# Patient Record
Sex: FEMALE | Race: WHITE | NOT HISPANIC OR LATINO | Employment: FULL TIME | RURAL
[De-identification: names, ages, dates, MRNs, and addresses within clinical notes are randomized per-mention and may not be internally consistent; named-entity substitution may affect disease eponyms.]

---

## 2017-02-08 ENCOUNTER — HISTORICAL (OUTPATIENT)
Dept: ADMINISTRATIVE | Facility: HOSPITAL | Age: 32
End: 2017-02-08

## 2017-02-10 LAB
LAB AP CLINICAL INFORMATION: NORMAL
LAB AP DIAGNOSIS - HISTORICAL: NORMAL
LAB AP GROSS PATHOLOGY - HISTORICAL: NORMAL
LAB AP SPECIMEN SUBMITTED - HISTORICAL: NORMAL

## 2020-04-14 ENCOUNTER — HISTORICAL (OUTPATIENT)
Dept: ADMINISTRATIVE | Facility: HOSPITAL | Age: 35
End: 2020-04-14

## 2020-12-16 ENCOUNTER — HISTORICAL (OUTPATIENT)
Dept: ADMINISTRATIVE | Facility: HOSPITAL | Age: 35
End: 2020-12-16

## 2021-06-07 RX ORDER — TELMISARTAN 40 MG/1
40 TABLET ORAL DAILY
Qty: 30 TABLET | Refills: 1 | Status: SHIPPED | OUTPATIENT
Start: 2021-06-07 | End: 2021-09-02 | Stop reason: SDUPTHER

## 2021-06-07 RX ORDER — TELMISARTAN 40 MG/1
40 TABLET ORAL DAILY
COMMUNITY
Start: 2021-03-23 | End: 2021-06-07 | Stop reason: SDUPTHER

## 2021-06-30 RX ORDER — FUROSEMIDE 20 MG/1
20 TABLET ORAL DAILY PRN
Qty: 30 TABLET | Refills: 0 | Status: SHIPPED | OUTPATIENT
Start: 2021-06-30 | End: 2021-08-30

## 2021-06-30 RX ORDER — FUROSEMIDE 20 MG/1
20 TABLET ORAL DAILY PRN
COMMUNITY
Start: 2021-01-14 | End: 2021-06-30 | Stop reason: SDUPTHER

## 2021-08-11 ENCOUNTER — OFFICE VISIT (OUTPATIENT)
Dept: FAMILY MEDICINE | Facility: CLINIC | Age: 36
End: 2021-08-11
Payer: COMMERCIAL

## 2021-08-11 VITALS — HEART RATE: 92 BPM | TEMPERATURE: 98 F | RESPIRATION RATE: 18 BRPM | OXYGEN SATURATION: 99 %

## 2021-08-11 DIAGNOSIS — J32.0 MAXILLARY SINUSITIS, UNSPECIFIED CHRONICITY: Primary | ICD-10-CM

## 2021-08-11 PROCEDURE — 96372 THER/PROPH/DIAG INJ SC/IM: CPT | Mod: ,,, | Performed by: NURSE PRACTITIONER

## 2021-08-11 PROCEDURE — 96372 PR INJECTION,THERAP/PROPH/DIAG2ST, IM OR SUBCUT: ICD-10-PCS | Mod: ,,, | Performed by: NURSE PRACTITIONER

## 2021-08-11 PROCEDURE — 99214 OFFICE O/P EST MOD 30 MIN: CPT | Mod: 25,,, | Performed by: NURSE PRACTITIONER

## 2021-08-11 PROCEDURE — 99214 PR OFFICE/OUTPT VISIT, EST, LEVL IV, 30-39 MIN: ICD-10-PCS | Mod: 25,,, | Performed by: NURSE PRACTITIONER

## 2021-08-11 RX ORDER — CEFTRIAXONE 1 G/1
1 INJECTION, POWDER, FOR SOLUTION INTRAMUSCULAR; INTRAVENOUS
Status: COMPLETED | OUTPATIENT
Start: 2021-08-11 | End: 2021-08-11

## 2021-08-11 RX ORDER — BUPROPION HYDROCHLORIDE 150 MG/1
1 TABLET ORAL DAILY
COMMUNITY
Start: 2021-08-04 | End: 2021-09-02 | Stop reason: SDUPTHER

## 2021-08-11 RX ORDER — CLONAZEPAM 0.5 MG/1
1 TABLET ORAL 2 TIMES DAILY
COMMUNITY
Start: 2021-08-04 | End: 2021-09-02 | Stop reason: SDUPTHER

## 2021-08-11 RX ORDER — ZOLPIDEM TARTRATE 10 MG/1
1 TABLET ORAL NIGHTLY
COMMUNITY
Start: 2021-08-04 | End: 2021-09-02

## 2021-08-11 RX ORDER — AZITHROMYCIN 250 MG/1
TABLET, FILM COATED ORAL
Qty: 6 TABLET | Refills: 0 | Status: SHIPPED | OUTPATIENT
Start: 2021-08-11 | End: 2021-08-16

## 2021-08-11 RX ORDER — FLUTICASONE PROPIONATE 50 MCG
1 SPRAY, SUSPENSION (ML) NASAL DAILY
Qty: 18 G | Refills: 0 | Status: SHIPPED | OUTPATIENT
Start: 2021-08-11 | End: 2021-08-30

## 2021-08-11 RX ORDER — DEXAMETHASONE SODIUM PHOSPHATE 4 MG/ML
4 INJECTION, SOLUTION INTRA-ARTICULAR; INTRALESIONAL; INTRAMUSCULAR; INTRAVENOUS; SOFT TISSUE
Status: COMPLETED | OUTPATIENT
Start: 2021-08-11 | End: 2021-08-11

## 2021-08-11 RX ORDER — DEXBROMPHENIRAMINE MALEATE 2 MG/1
1 TABLET ORAL EVERY 8 HOURS PRN
Qty: 32 TABLET | Refills: 0 | Status: SHIPPED | OUTPATIENT
Start: 2021-08-11 | End: 2021-08-30

## 2021-08-11 RX ORDER — CITALOPRAM 40 MG/1
1 TABLET, FILM COATED ORAL DAILY
COMMUNITY
Start: 2021-06-30 | End: 2021-09-02 | Stop reason: SDUPTHER

## 2021-08-11 RX ADMIN — CEFTRIAXONE 1 G: 1 INJECTION, POWDER, FOR SOLUTION INTRAMUSCULAR; INTRAVENOUS at 05:08

## 2021-08-11 RX ADMIN — DEXAMETHASONE SODIUM PHOSPHATE 4 MG: 4 INJECTION, SOLUTION INTRA-ARTICULAR; INTRALESIONAL; INTRAMUSCULAR; INTRAVENOUS; SOFT TISSUE at 05:08

## 2021-08-30 ENCOUNTER — OFFICE VISIT (OUTPATIENT)
Dept: FAMILY MEDICINE | Facility: CLINIC | Age: 36
End: 2021-08-30
Payer: COMMERCIAL

## 2021-08-30 VITALS
RESPIRATION RATE: 18 BRPM | HEART RATE: 74 BPM | SYSTOLIC BLOOD PRESSURE: 120 MMHG | TEMPERATURE: 98 F | WEIGHT: 200 LBS | BODY MASS INDEX: 33.32 KG/M2 | HEIGHT: 65 IN | OXYGEN SATURATION: 99 % | DIASTOLIC BLOOD PRESSURE: 80 MMHG

## 2021-08-30 DIAGNOSIS — R23.2 RED FACE: Primary | ICD-10-CM

## 2021-08-30 DIAGNOSIS — L65.9 HAIR LOSS: ICD-10-CM

## 2021-08-30 DIAGNOSIS — E55.9 VITAMIN D DEFICIENCY: ICD-10-CM

## 2021-08-30 DIAGNOSIS — F41.9 ANXIETY: ICD-10-CM

## 2021-08-30 DIAGNOSIS — Z86.59 HX OF ATTENTION DEFICIT DISORDER: ICD-10-CM

## 2021-08-30 DIAGNOSIS — G47.9 SLEEP DISORDER: ICD-10-CM

## 2021-08-30 LAB
25(OH)D3 SERPL-MCNC: 37 NG/ML
ANISOCYTOSIS BLD QL SMEAR: ABNORMAL
BASOPHILS # BLD AUTO: 0.06 K/UL (ref 0–0.2)
BASOPHILS NFR BLD AUTO: 0.6 % (ref 0–1)
DIFFERENTIAL METHOD BLD: ABNORMAL
EOSINOPHIL # BLD AUTO: 0.19 K/UL (ref 0–0.5)
EOSINOPHIL NFR BLD AUTO: 1.9 % (ref 1–4)
ERYTHROCYTE [DISTWIDTH] IN BLOOD BY AUTOMATED COUNT: 25.1 % (ref 11.5–14.5)
HCT VFR BLD AUTO: 39.9 % (ref 38–47)
HGB BLD-MCNC: 12.5 G/DL (ref 12–16)
IMM GRANULOCYTES # BLD AUTO: 0.03 K/UL (ref 0–0.04)
IMM GRANULOCYTES NFR BLD: 0.3 % (ref 0–0.4)
LYMPHOCYTES # BLD AUTO: 3.23 K/UL (ref 1–4.8)
LYMPHOCYTES NFR BLD AUTO: 33 % (ref 27–41)
MCH RBC QN AUTO: 24.4 PG (ref 27–31)
MCHC RBC AUTO-ENTMCNC: 31.3 G/DL (ref 32–36)
MCV RBC AUTO: 77.9 FL (ref 80–96)
MONOCYTES # BLD AUTO: 0.72 K/UL (ref 0–0.8)
MONOCYTES NFR BLD AUTO: 7.4 % (ref 2–6)
MPC BLD CALC-MCNC: 9.4 FL (ref 9.4–12.4)
NEUTROPHILS # BLD AUTO: 5.56 K/UL (ref 1.8–7.7)
NEUTROPHILS NFR BLD AUTO: 56.8 % (ref 53–65)
NRBC # BLD AUTO: 0 X10E3/UL
NRBC, AUTO (.00): 0 %
PLATELET # BLD AUTO: 393 K/UL (ref 150–400)
PLATELET MORPHOLOGY: ABNORMAL
RBC # BLD AUTO: 5.12 M/UL (ref 4.2–5.4)
T3 SERPL-MCNC: 99 NG/DL (ref 60–181)
TSH SERPL DL<=0.005 MIU/L-ACNC: 1.48 UIU/ML (ref 0.36–3.74)
WBC # BLD AUTO: 9.79 K/UL (ref 4.5–11)

## 2021-08-30 PROCEDURE — 84480 T3: ICD-10-PCS | Mod: ,,, | Performed by: CLINICAL MEDICAL LABORATORY

## 2021-08-30 PROCEDURE — 99214 PR OFFICE/OUTPT VISIT, EST, LEVL IV, 30-39 MIN: ICD-10-PCS | Mod: ,,, | Performed by: NURSE PRACTITIONER

## 2021-08-30 PROCEDURE — 85025 CBC WITH DIFFERENTIAL: ICD-10-PCS | Mod: ,,, | Performed by: CLINICAL MEDICAL LABORATORY

## 2021-08-30 PROCEDURE — 86038 ANA EIA W/REFLEX DSDNA/ENA: ICD-10-PCS | Mod: ,,, | Performed by: CLINICAL MEDICAL LABORATORY

## 2021-08-30 PROCEDURE — 84436 T4: ICD-10-PCS | Mod: ,,, | Performed by: CLINICAL MEDICAL LABORATORY

## 2021-08-30 PROCEDURE — 86038 ANTINUCLEAR ANTIBODIES: CPT | Mod: ,,, | Performed by: CLINICAL MEDICAL LABORATORY

## 2021-08-30 PROCEDURE — 99214 OFFICE O/P EST MOD 30 MIN: CPT | Mod: ,,, | Performed by: NURSE PRACTITIONER

## 2021-08-30 PROCEDURE — 84436 ASSAY OF TOTAL THYROXINE: CPT | Mod: ,,, | Performed by: CLINICAL MEDICAL LABORATORY

## 2021-08-30 PROCEDURE — 84480 ASSAY TRIIODOTHYRONINE (T3): CPT | Mod: ,,, | Performed by: CLINICAL MEDICAL LABORATORY

## 2021-08-30 PROCEDURE — 84443 ASSAY THYROID STIM HORMONE: CPT | Mod: ,,, | Performed by: CLINICAL MEDICAL LABORATORY

## 2021-08-30 PROCEDURE — 85025 COMPLETE CBC W/AUTO DIFF WBC: CPT | Mod: ,,, | Performed by: CLINICAL MEDICAL LABORATORY

## 2021-08-30 PROCEDURE — 82306 VITAMIN D: ICD-10-PCS | Mod: ,,, | Performed by: CLINICAL MEDICAL LABORATORY

## 2021-08-30 PROCEDURE — 82306 VITAMIN D 25 HYDROXY: CPT | Mod: ,,, | Performed by: CLINICAL MEDICAL LABORATORY

## 2021-08-30 PROCEDURE — 84443 TSH: ICD-10-PCS | Mod: ,,, | Performed by: CLINICAL MEDICAL LABORATORY

## 2021-08-30 RX ORDER — PROPRANOLOL HYDROCHLORIDE 60 MG/1
1 CAPSULE, EXTENDED RELEASE ORAL DAILY
COMMUNITY
Start: 2021-06-03 | End: 2021-09-02

## 2021-08-30 RX ORDER — DEXTROAMPHETAMINE SULFATE, DEXTROAMPHETAMINE SACCHARATE, AMPHETAMINE ASPARTATE MONOHYDRATE, AND AMPHETAMINE SULFATE 12.5; 12.5; 12.5; 12.5 MG/1; MG/1; MG/1; MG/1
1 CAPSULE, EXTENDED RELEASE ORAL DAILY
COMMUNITY
Start: 2021-08-04 | End: 2021-09-02 | Stop reason: SDUPTHER

## 2021-08-30 RX ORDER — TRIAMCINOLONE ACETONIDE 0.25 MG/G
1 CREAM TOPICAL DAILY PRN
COMMUNITY
Start: 2021-04-16 | End: 2021-09-02

## 2021-08-30 RX ORDER — FLUOXETINE HYDROCHLORIDE 40 MG/1
1 CAPSULE ORAL DAILY
COMMUNITY
Start: 2021-03-10 | End: 2021-08-30

## 2021-08-31 LAB — ANA SER QL: NEGATIVE

## 2021-08-31 RX ORDER — CEPHALEXIN 500 MG/1
500 CAPSULE ORAL EVERY 8 HOURS
Qty: 30 CAPSULE | Refills: 0 | Status: SHIPPED | OUTPATIENT
Start: 2021-08-31 | End: 2021-10-18

## 2021-09-01 LAB — T4 SERPL-MCNC: 6.6 ΜG/DL (ref 4.8–13.9)

## 2021-09-02 DIAGNOSIS — Z79.899 HIGH RISK MEDICATION USE: Primary | ICD-10-CM

## 2021-09-02 RX ORDER — TELMISARTAN 40 MG/1
40 TABLET ORAL DAILY
Qty: 90 TABLET | Refills: 1 | Status: CANCELLED | OUTPATIENT
Start: 2021-09-02

## 2021-09-02 RX ORDER — CITALOPRAM 40 MG/1
40 TABLET, FILM COATED ORAL DAILY
Qty: 90 TABLET | Refills: 0 | Status: SHIPPED | OUTPATIENT
Start: 2021-09-02 | End: 2021-10-18

## 2021-09-02 RX ORDER — CLONAZEPAM 0.5 MG/1
0.5 TABLET ORAL DAILY
Qty: 90 TABLET | Refills: 1 | Status: CANCELLED | OUTPATIENT
Start: 2021-09-02

## 2021-09-02 RX ORDER — CLONAZEPAM 0.5 MG/1
0.5 TABLET ORAL 2 TIMES DAILY
Qty: 60 TABLET | Refills: 0 | Status: SHIPPED | OUTPATIENT
Start: 2021-09-02 | End: 2021-10-18

## 2021-09-02 RX ORDER — TELMISARTAN 40 MG/1
40 TABLET ORAL DAILY
Qty: 90 TABLET | Refills: 0 | Status: SHIPPED | OUTPATIENT
Start: 2021-09-02 | End: 2022-01-27 | Stop reason: SDUPTHER

## 2021-09-02 RX ORDER — PROPRANOLOL HYDROCHLORIDE 80 MG/1
80 CAPSULE, EXTENDED RELEASE ORAL DAILY
Qty: 90 CAPSULE | Refills: 0 | Status: SHIPPED | OUTPATIENT
Start: 2021-09-02 | End: 2021-10-18

## 2021-09-02 RX ORDER — ERGOCALCIFEROL 1.25 MG/1
50000 CAPSULE ORAL
Qty: 12 CAPSULE | Refills: 0 | OUTPATIENT
Start: 2021-09-02

## 2021-09-02 RX ORDER — OMEPRAZOLE 20 MG/1
20 CAPSULE, DELAYED RELEASE ORAL DAILY
Qty: 90 CAPSULE | Refills: 0 | OUTPATIENT
Start: 2021-09-02

## 2021-09-02 RX ORDER — CLONAZEPAM 0.5 MG/1
0.5 TABLET ORAL 2 TIMES DAILY PRN
Qty: 60 TABLET | Refills: 0 | OUTPATIENT
Start: 2021-09-02

## 2021-09-02 RX ORDER — OMEPRAZOLE 20 MG/1
20 CAPSULE, DELAYED RELEASE ORAL DAILY
Qty: 90 CAPSULE | Refills: 0 | Status: SHIPPED | OUTPATIENT
Start: 2021-09-02 | End: 2022-08-26

## 2021-09-02 RX ORDER — ERGOCALCIFEROL 1.25 MG/1
50000 CAPSULE ORAL
COMMUNITY
End: 2021-09-02 | Stop reason: SDUPTHER

## 2021-09-02 RX ORDER — BUPROPION HYDROCHLORIDE 150 MG/1
150 TABLET ORAL DAILY
Qty: 90 TABLET | Refills: 0 | OUTPATIENT
Start: 2021-09-02

## 2021-09-02 RX ORDER — BUPROPION HYDROCHLORIDE 150 MG/1
150 TABLET ORAL DAILY
Qty: 90 TABLET | Refills: 0 | Status: SHIPPED | OUTPATIENT
Start: 2021-09-02 | End: 2021-10-18

## 2021-09-02 RX ORDER — PROPRANOLOL HYDROCHLORIDE 80 MG/1
80 CAPSULE, EXTENDED RELEASE ORAL DAILY
Qty: 90 CAPSULE | Refills: 0 | OUTPATIENT
Start: 2021-09-02

## 2021-09-02 RX ORDER — FUROSEMIDE 20 MG/1
20 TABLET ORAL DAILY
Qty: 90 TABLET | Refills: 0 | OUTPATIENT
Start: 2021-09-02

## 2021-09-02 RX ORDER — ZOLPIDEM TARTRATE 10 MG/1
10 TABLET ORAL NIGHTLY
Qty: 30 TABLET | Refills: 0 | OUTPATIENT
Start: 2021-09-02

## 2021-09-02 RX ORDER — PROPRANOLOL HYDROCHLORIDE 80 MG/1
80 CAPSULE, EXTENDED RELEASE ORAL DAILY
COMMUNITY
End: 2021-09-02 | Stop reason: SDUPTHER

## 2021-09-02 RX ORDER — ERGOCALCIFEROL 1.25 MG/1
50000 CAPSULE ORAL
Qty: 12 CAPSULE | Refills: 0 | Status: SHIPPED | OUTPATIENT
Start: 2021-09-02 | End: 2021-10-18

## 2021-09-02 RX ORDER — DEXTROAMPHETAMINE SULFATE, DEXTROAMPHETAMINE SACCHARATE, AMPHETAMINE ASPARTATE MONOHYDRATE, AND AMPHETAMINE SULFATE 12.5; 12.5; 12.5; 12.5 MG/1; MG/1; MG/1; MG/1
1 CAPSULE, EXTENDED RELEASE ORAL DAILY
Qty: 30 EACH | Refills: 0 | Status: SHIPPED | OUTPATIENT
Start: 2021-09-02 | End: 2021-10-18

## 2021-09-02 RX ORDER — TELMISARTAN 40 MG/1
40 TABLET ORAL DAILY
Qty: 90 TABLET | Refills: 0 | OUTPATIENT
Start: 2021-09-02

## 2021-09-02 RX ORDER — DEXTROAMPHETAMINE SULFATE, DEXTROAMPHETAMINE SACCHARATE, AMPHETAMINE ASPARTATE MONOHYDRATE, AND AMPHETAMINE SULFATE 12.5; 12.5; 12.5; 12.5 MG/1; MG/1; MG/1; MG/1
1 CAPSULE, EXTENDED RELEASE ORAL DAILY
Qty: 30 EACH | Refills: 0 | OUTPATIENT
Start: 2021-09-02

## 2021-09-02 RX ORDER — OMEPRAZOLE 20 MG/1
20 CAPSULE, DELAYED RELEASE ORAL DAILY
COMMUNITY
End: 2021-09-02 | Stop reason: SDUPTHER

## 2021-09-02 RX ORDER — CITALOPRAM 40 MG/1
40 TABLET, FILM COATED ORAL DAILY
Qty: 90 TABLET | Refills: 0 | OUTPATIENT
Start: 2021-09-02

## 2021-09-02 RX ORDER — FUROSEMIDE 20 MG/1
20 TABLET ORAL DAILY
Qty: 90 TABLET | Refills: 0 | Status: SHIPPED | OUTPATIENT
Start: 2021-09-02 | End: 2021-10-05 | Stop reason: SDUPTHER

## 2021-09-02 RX ORDER — FUROSEMIDE 20 MG/1
20 TABLET ORAL DAILY
COMMUNITY
End: 2021-09-02 | Stop reason: SDUPTHER

## 2021-10-05 RX ORDER — FUROSEMIDE 20 MG/1
20 TABLET ORAL DAILY
Qty: 30 TABLET | Refills: 0 | Status: SHIPPED | OUTPATIENT
Start: 2021-10-05 | End: 2021-11-17 | Stop reason: SDUPTHER

## 2021-10-18 ENCOUNTER — OFFICE VISIT (OUTPATIENT)
Dept: FAMILY MEDICINE | Facility: CLINIC | Age: 36
End: 2021-10-18
Payer: COMMERCIAL

## 2021-10-18 VITALS
DIASTOLIC BLOOD PRESSURE: 88 MMHG | WEIGHT: 191 LBS | BODY MASS INDEX: 31.82 KG/M2 | RESPIRATION RATE: 18 BRPM | HEIGHT: 65 IN | HEART RATE: 92 BPM | OXYGEN SATURATION: 98 % | TEMPERATURE: 99 F | SYSTOLIC BLOOD PRESSURE: 118 MMHG

## 2021-10-18 DIAGNOSIS — R23.2 FACIAL FLUSHING: Primary | ICD-10-CM

## 2021-10-18 DIAGNOSIS — I10 PRIMARY HYPERTENSION: ICD-10-CM

## 2021-10-18 DIAGNOSIS — L65.9 HAIR LOSS: ICD-10-CM

## 2021-10-18 PROCEDURE — 99212 PR OFFICE/OUTPT VISIT, EST, LEVL II, 10-19 MIN: ICD-10-PCS | Mod: ,,, | Performed by: NURSE PRACTITIONER

## 2021-10-18 PROCEDURE — 99212 OFFICE O/P EST SF 10 MIN: CPT | Mod: ,,, | Performed by: NURSE PRACTITIONER

## 2021-10-18 RX ORDER — DIAZEPAM 5 MG/1
TABLET ORAL
COMMUNITY
Start: 2021-09-20 | End: 2022-09-15

## 2021-10-18 RX ORDER — PROPRANOLOL HYDROCHLORIDE 60 MG/1
1 CAPSULE, EXTENDED RELEASE ORAL NIGHTLY
COMMUNITY
Start: 2021-09-20 | End: 2022-01-27 | Stop reason: SDUPTHER

## 2021-10-18 RX ORDER — DEXTROAMPHETAMINE SACCHARATE, AMPHETAMINE ASPARTATE MONOHYDRATE, DEXTROAMPHETAMINE SULFATE AND AMPHETAMINE SULFATE 3.75; 3.75; 3.75; 3.75 MG/1; MG/1; MG/1; MG/1
1 CAPSULE, EXTENDED RELEASE ORAL DAILY
COMMUNITY
Start: 2021-10-04 | End: 2022-07-15

## 2021-10-18 RX ORDER — ESCITALOPRAM OXALATE 20 MG/1
1 TABLET ORAL DAILY
COMMUNITY
Start: 2021-10-04 | End: 2022-07-15

## 2021-10-18 RX ORDER — HYDROXYZINE PAMOATE 50 MG/1
1-2 CAPSULE ORAL NIGHTLY
COMMUNITY
Start: 2021-09-20 | End: 2022-07-29

## 2021-10-25 ENCOUNTER — OFFICE VISIT (OUTPATIENT)
Dept: FAMILY MEDICINE | Facility: CLINIC | Age: 36
End: 2021-10-25
Payer: COMMERCIAL

## 2021-10-25 VITALS
HEART RATE: 81 BPM | WEIGHT: 191 LBS | DIASTOLIC BLOOD PRESSURE: 80 MMHG | BODY MASS INDEX: 31.82 KG/M2 | RESPIRATION RATE: 18 BRPM | SYSTOLIC BLOOD PRESSURE: 118 MMHG | HEIGHT: 65 IN | OXYGEN SATURATION: 99 % | TEMPERATURE: 98 F

## 2021-10-25 DIAGNOSIS — J40 BRONCHITIS: ICD-10-CM

## 2021-10-25 DIAGNOSIS — J32.0 MAXILLARY SINUSITIS, UNSPECIFIED CHRONICITY: Primary | ICD-10-CM

## 2021-10-25 PROCEDURE — 99213 PR OFFICE/OUTPT VISIT, EST, LEVL III, 20-29 MIN: ICD-10-PCS | Mod: 25,,, | Performed by: NURSE PRACTITIONER

## 2021-10-25 PROCEDURE — 96372 THER/PROPH/DIAG INJ SC/IM: CPT | Mod: ,,, | Performed by: NURSE PRACTITIONER

## 2021-10-25 PROCEDURE — 99213 OFFICE O/P EST LOW 20 MIN: CPT | Mod: 25,,, | Performed by: NURSE PRACTITIONER

## 2021-10-25 PROCEDURE — 96372 PR INJECTION,THERAP/PROPH/DIAG2ST, IM OR SUBCUT: ICD-10-PCS | Mod: ,,, | Performed by: NURSE PRACTITIONER

## 2021-10-25 RX ORDER — DEXAMETHASONE SODIUM PHOSPHATE 4 MG/ML
4 INJECTION, SOLUTION INTRA-ARTICULAR; INTRALESIONAL; INTRAMUSCULAR; INTRAVENOUS; SOFT TISSUE
Status: COMPLETED | OUTPATIENT
Start: 2021-10-25 | End: 2021-10-25

## 2021-10-25 RX ORDER — AZITHROMYCIN 250 MG/1
TABLET, FILM COATED ORAL
Qty: 6 TABLET | Refills: 0 | Status: SHIPPED | OUTPATIENT
Start: 2021-10-25 | End: 2021-10-29

## 2021-10-25 RX ORDER — METHYLPREDNISOLONE 4 MG/1
TABLET ORAL
Qty: 21 EACH | Refills: 0 | Status: SHIPPED | OUTPATIENT
Start: 2021-10-25 | End: 2021-11-15

## 2021-10-25 RX ADMIN — DEXAMETHASONE SODIUM PHOSPHATE 4 MG: 4 INJECTION, SOLUTION INTRA-ARTICULAR; INTRALESIONAL; INTRAMUSCULAR; INTRAVENOUS; SOFT TISSUE at 03:10

## 2021-10-29 ENCOUNTER — OFFICE VISIT (OUTPATIENT)
Dept: FAMILY MEDICINE | Facility: CLINIC | Age: 36
End: 2021-10-29
Payer: COMMERCIAL

## 2021-10-29 VITALS
SYSTOLIC BLOOD PRESSURE: 108 MMHG | OXYGEN SATURATION: 98 % | WEIGHT: 194.38 LBS | DIASTOLIC BLOOD PRESSURE: 74 MMHG | TEMPERATURE: 99 F | BODY MASS INDEX: 32.35 KG/M2 | HEART RATE: 64 BPM | RESPIRATION RATE: 18 BRPM

## 2021-10-29 DIAGNOSIS — Z20.822 EXPOSURE TO COVID-19 VIRUS: ICD-10-CM

## 2021-10-29 DIAGNOSIS — R05.9 COUGH: ICD-10-CM

## 2021-10-29 DIAGNOSIS — J01.00 ACUTE NON-RECURRENT MAXILLARY SINUSITIS: Primary | ICD-10-CM

## 2021-10-29 LAB
CTP QC/QA: YES
FLUAV AG NPH QL: NEGATIVE
FLUBV AG NPH QL: NEGATIVE
SARS-COV-2 AG RESP QL IA.RAPID: NEGATIVE

## 2021-10-29 PROCEDURE — 99214 OFFICE O/P EST MOD 30 MIN: CPT | Mod: 25,,, | Performed by: NURSE PRACTITIONER

## 2021-10-29 PROCEDURE — 87428 POCT SARS-COV2 (COVID) WITH FLU ANTIGEN: ICD-10-PCS | Mod: QW,,, | Performed by: NURSE PRACTITIONER

## 2021-10-29 PROCEDURE — 96372 THER/PROPH/DIAG INJ SC/IM: CPT | Mod: ,,, | Performed by: NURSE PRACTITIONER

## 2021-10-29 PROCEDURE — 99214 PR OFFICE/OUTPT VISIT, EST, LEVL IV, 30-39 MIN: ICD-10-PCS | Mod: 25,,, | Performed by: NURSE PRACTITIONER

## 2021-10-29 PROCEDURE — 96372 PR INJECTION,THERAP/PROPH/DIAG2ST, IM OR SUBCUT: ICD-10-PCS | Mod: ,,, | Performed by: NURSE PRACTITIONER

## 2021-10-29 PROCEDURE — 87428 SARSCOV & INF VIR A&B AG IA: CPT | Mod: QW,,, | Performed by: NURSE PRACTITIONER

## 2021-10-29 RX ORDER — DOXYCYCLINE 100 MG/1
100 CAPSULE ORAL 2 TIMES DAILY
Qty: 20 CAPSULE | Refills: 0 | Status: SHIPPED | OUTPATIENT
Start: 2021-10-29 | End: 2021-12-28

## 2021-10-29 RX ORDER — BENZONATATE 100 MG/1
100 CAPSULE ORAL 3 TIMES DAILY PRN
Qty: 30 CAPSULE | Refills: 1 | Status: SHIPPED | OUTPATIENT
Start: 2021-10-29 | End: 2021-11-08

## 2021-10-29 RX ORDER — CEFTRIAXONE 1 G/1
1 INJECTION, POWDER, FOR SOLUTION INTRAMUSCULAR; INTRAVENOUS
Status: COMPLETED | OUTPATIENT
Start: 2021-10-29 | End: 2021-10-29

## 2021-10-29 RX ADMIN — CEFTRIAXONE 1 G: 1 INJECTION, POWDER, FOR SOLUTION INTRAMUSCULAR; INTRAVENOUS at 12:10

## 2021-11-18 RX ORDER — FUROSEMIDE 20 MG/1
20 TABLET ORAL DAILY
Qty: 30 TABLET | Refills: 0 | Status: SHIPPED | OUTPATIENT
Start: 2021-11-18 | End: 2022-02-21 | Stop reason: SDUPTHER

## 2021-12-28 ENCOUNTER — OFFICE VISIT (OUTPATIENT)
Dept: DERMATOLOGY | Facility: CLINIC | Age: 36
End: 2021-12-28
Payer: COMMERCIAL

## 2021-12-28 VITALS — WEIGHT: 194.44 LBS | BODY MASS INDEX: 32.4 KG/M2 | HEIGHT: 65 IN

## 2021-12-28 DIAGNOSIS — L71.9 ROSACEA: Primary | ICD-10-CM

## 2021-12-28 DIAGNOSIS — R23.2 FACIAL FLUSHING: ICD-10-CM

## 2021-12-28 DIAGNOSIS — L65.9 HAIR LOSS: ICD-10-CM

## 2021-12-28 PROCEDURE — 4010F ACE/ARB THERAPY RXD/TAKEN: CPT | Mod: CPTII,,, | Performed by: STUDENT IN AN ORGANIZED HEALTH CARE EDUCATION/TRAINING PROGRAM

## 2021-12-28 PROCEDURE — 1159F PR MEDICATION LIST DOCUMENTED IN MEDICAL RECORD: ICD-10-PCS | Mod: CPTII,,, | Performed by: STUDENT IN AN ORGANIZED HEALTH CARE EDUCATION/TRAINING PROGRAM

## 2021-12-28 PROCEDURE — 4010F PR ACE/ARB THEARPY RXD/TAKEN: ICD-10-PCS | Mod: CPTII,,, | Performed by: STUDENT IN AN ORGANIZED HEALTH CARE EDUCATION/TRAINING PROGRAM

## 2021-12-28 PROCEDURE — 99204 PR OFFICE/OUTPT VISIT, NEW, LEVL IV, 45-59 MIN: ICD-10-PCS | Mod: ,,, | Performed by: STUDENT IN AN ORGANIZED HEALTH CARE EDUCATION/TRAINING PROGRAM

## 2021-12-28 PROCEDURE — 3008F BODY MASS INDEX DOCD: CPT | Mod: CPTII,,, | Performed by: STUDENT IN AN ORGANIZED HEALTH CARE EDUCATION/TRAINING PROGRAM

## 2021-12-28 PROCEDURE — 99204 OFFICE O/P NEW MOD 45 MIN: CPT | Mod: ,,, | Performed by: STUDENT IN AN ORGANIZED HEALTH CARE EDUCATION/TRAINING PROGRAM

## 2021-12-28 PROCEDURE — 3008F PR BODY MASS INDEX (BMI) DOCUMENTED: ICD-10-PCS | Mod: CPTII,,, | Performed by: STUDENT IN AN ORGANIZED HEALTH CARE EDUCATION/TRAINING PROGRAM

## 2021-12-28 PROCEDURE — 3044F HG A1C LEVEL LT 7.0%: CPT | Mod: CPTII,,, | Performed by: STUDENT IN AN ORGANIZED HEALTH CARE EDUCATION/TRAINING PROGRAM

## 2021-12-28 PROCEDURE — 3044F PR MOST RECENT HEMOGLOBIN A1C LEVEL <7.0%: ICD-10-PCS | Mod: CPTII,,, | Performed by: STUDENT IN AN ORGANIZED HEALTH CARE EDUCATION/TRAINING PROGRAM

## 2021-12-28 PROCEDURE — 1159F MED LIST DOCD IN RCRD: CPT | Mod: CPTII,,, | Performed by: STUDENT IN AN ORGANIZED HEALTH CARE EDUCATION/TRAINING PROGRAM

## 2021-12-28 RX ORDER — DOXYCYCLINE 100 MG/1
100 CAPSULE ORAL 2 TIMES DAILY
Qty: 60 CAPSULE | Refills: 2 | Status: SHIPPED | OUTPATIENT
Start: 2021-12-28 | End: 2022-01-27

## 2022-01-01 DIAGNOSIS — F98.8 ATTENTION DEFICIT DISORDER (ADD) WITHOUT HYPERACTIVITY: ICD-10-CM

## 2022-01-01 RX ORDER — DEXTROAMPHETAMINE SACCHARATE, AMPHETAMINE ASPARTATE MONOHYDRATE, DEXTROAMPHETAMINE SULFATE AND AMPHETAMINE SULFATE 7.5; 7.5; 7.5; 7.5 MG/1; MG/1; MG/1; MG/1
30 CAPSULE, EXTENDED RELEASE ORAL EVERY MORNING
Qty: 30 CAPSULE | Refills: 0 | Status: SHIPPED | OUTPATIENT
Start: 2022-01-01 | End: 2023-01-01 | Stop reason: SDUPTHER

## 2022-01-01 RX ORDER — DEXTROAMPHETAMINE SACCHARATE, AMPHETAMINE ASPARTATE MONOHYDRATE, DEXTROAMPHETAMINE SULFATE AND AMPHETAMINE SULFATE 5; 5; 5; 5 MG/1; MG/1; MG/1; MG/1
20 CAPSULE, EXTENDED RELEASE ORAL EVERY MORNING
Qty: 30 CAPSULE | Refills: 0 | Status: SHIPPED | OUTPATIENT
Start: 2022-01-01 | End: 2023-01-01

## 2022-01-17 ENCOUNTER — OFFICE VISIT (OUTPATIENT)
Dept: FAMILY MEDICINE | Facility: CLINIC | Age: 37
End: 2022-01-17
Payer: COMMERCIAL

## 2022-01-17 VITALS
WEIGHT: 200 LBS | DIASTOLIC BLOOD PRESSURE: 106 MMHG | TEMPERATURE: 99 F | HEIGHT: 65 IN | RESPIRATION RATE: 16 BRPM | HEART RATE: 93 BPM | OXYGEN SATURATION: 93 % | SYSTOLIC BLOOD PRESSURE: 140 MMHG | BODY MASS INDEX: 33.32 KG/M2

## 2022-01-17 DIAGNOSIS — U07.1 COVID: ICD-10-CM

## 2022-01-17 DIAGNOSIS — Z20.822 EXPOSURE TO COVID-19 VIRUS: Primary | ICD-10-CM

## 2022-01-17 LAB
CTP QC/QA: YES
SARS-COV-2 AG RESP QL IA.RAPID: POSITIVE

## 2022-01-17 PROCEDURE — 3008F BODY MASS INDEX DOCD: CPT | Mod: CPTII,,, | Performed by: FAMILY MEDICINE

## 2022-01-17 PROCEDURE — 3080F PR MOST RECENT DIASTOLIC BLOOD PRESSURE >= 90 MM HG: ICD-10-PCS | Mod: CPTII,,, | Performed by: FAMILY MEDICINE

## 2022-01-17 PROCEDURE — 3077F SYST BP >= 140 MM HG: CPT | Mod: CPTII,,, | Performed by: FAMILY MEDICINE

## 2022-01-17 PROCEDURE — 3008F PR BODY MASS INDEX (BMI) DOCUMENTED: ICD-10-PCS | Mod: CPTII,,, | Performed by: FAMILY MEDICINE

## 2022-01-17 PROCEDURE — 87426 SARS CORONAVIRUS 2 ANTIGEN POCT: ICD-10-PCS | Mod: QW,,, | Performed by: FAMILY MEDICINE

## 2022-01-17 PROCEDURE — 3077F PR MOST RECENT SYSTOLIC BLOOD PRESSURE >= 140 MM HG: ICD-10-PCS | Mod: CPTII,,, | Performed by: FAMILY MEDICINE

## 2022-01-17 PROCEDURE — 99213 PR OFFICE/OUTPT VISIT, EST, LEVL III, 20-29 MIN: ICD-10-PCS | Mod: ,,, | Performed by: FAMILY MEDICINE

## 2022-01-17 PROCEDURE — 3080F DIAST BP >= 90 MM HG: CPT | Mod: CPTII,,, | Performed by: FAMILY MEDICINE

## 2022-01-17 PROCEDURE — 99213 OFFICE O/P EST LOW 20 MIN: CPT | Mod: ,,, | Performed by: FAMILY MEDICINE

## 2022-01-17 PROCEDURE — 87426 SARSCOV CORONAVIRUS AG IA: CPT | Mod: QW,,, | Performed by: FAMILY MEDICINE

## 2022-01-17 RX ORDER — NAPROXEN SODIUM 220 MG/1
81 TABLET, FILM COATED ORAL DAILY
Qty: 30 TABLET | Refills: 0 | Status: SHIPPED | OUTPATIENT
Start: 2022-01-17 | End: 2022-07-15

## 2022-01-17 RX ORDER — ACETAMINOPHEN 500 MG
1000 TABLET ORAL EVERY 8 HOURS PRN
Qty: 60 TABLET | Refills: 0 | Status: SHIPPED | OUTPATIENT
Start: 2022-01-17 | End: 2022-01-27

## 2022-01-17 RX ORDER — GUAIFENESIN/DEXTROMETHORPHAN 100-10MG/5
5 SYRUP ORAL EVERY 6 HOURS PRN
Qty: 236 ML | Refills: 0 | Status: SHIPPED | OUTPATIENT
Start: 2022-01-17 | End: 2022-01-27

## 2022-01-17 RX ORDER — AZITHROMYCIN 250 MG/1
TABLET, FILM COATED ORAL
Qty: 6 TABLET | Refills: 0 | Status: SHIPPED | OUTPATIENT
Start: 2022-01-17 | End: 2022-01-27 | Stop reason: ALTCHOICE

## 2022-01-17 RX ORDER — IPRATROPIUM BROMIDE 42 UG/1
2 SPRAY, METERED NASAL 3 TIMES DAILY
Qty: 15 ML | Refills: 1 | Status: SHIPPED | OUTPATIENT
Start: 2022-01-17 | End: 2022-08-26

## 2022-01-17 NOTE — LETTER
January 17, 2022      [address] [place] [address]  Phone: [phone]  Fax: [phone]       Patient: Earline Lemus   YOB: 1985  Date of Visit: 01/17/2022    To Whom It May Concern:    Aquilino Lemus  was at [facility] on 01/17/2022. The patient may return to work/school on 01/24/2022 with no restrictions. If you have any questions or concerns, or if I can be of further assistance, please do not hesitate to contact me.    Sincerely,    Kathy Scott RN

## 2022-01-17 NOTE — PROGRESS NOTES
Giles Oneill DO   [facility]  [address]  [place]     PATIENT NAME: Earline Lemus  : 1985  DATE: 22  MRN: [id number]      Billing Provider: Giles Oneill DO  Level of Service:   Patient PCP Information     Provider PCP Type    Shira Harry NP General          Reason for Visit / Chief Complaint: Fever, Nausea, and Headache (Started yesterday with h'ache and low grade fever. Had household and work exposure)       Update PCP  Update Chief Complaint         History of Present Illness / Problem Focused Workflow     Earline Lemus presents to the clinic for COVID testing. Reports that symptoms began 2 days ago. Reports partner was exposed last week. Reports fever, body aches, nasal congestion and cough.PMH of HTN has not taken medication yet this morning. No other complaints.       Review of Systems     Review of Systems   Constitutional: Negative.    Eyes: Negative.    Respiratory: Negative.    Cardiovascular: Negative.    Gastrointestinal: Negative.    All other systems reviewed and are negative.       Medical / Social / Family History     Past Medical History:   Diagnosis Date    ADHD (attention deficit hyperactivity disorder)     Anxiety     Depression     Hypertension        Past Surgical History:   Procedure Laterality Date    TUBAL LIGATION         Social History  Ms.  reports that she has been smoking cigarettes and vaping with nicotine. She has been smoking about 0.50 packs per day. She has never used smokeless tobacco. She reports current alcohol use. She reports that she does not use drugs.    Family History  Ms.'s family history includes Heart disease in her father.    Medications and Allergies     Medications  No outpatient medications have been marked as taking for the 22 encounter (Office Visit) with Giles Oneill DO.       Allergies  Review of patient's allergies indicates:  No Known Allergies    Physical Examination     Vitals:    22  "1111   BP: (!) 150/102   BP Location: Left arm   Patient Position: Sitting   Pulse: 93   Resp: 16   Temp: 99.1 °F (37.3 °C)   TempSrc: Oral   SpO2: (!) 93%   Weight: 90.7 kg (200 lb)   Height: 5' 5" (1.651 m)      Physical Exam  Vitals and nursing note reviewed.   Constitutional:       General: She is not in acute distress.     Appearance: She is normal weight. She is not ill-appearing, toxic-appearing or diaphoretic.   Neck:      Vascular: No carotid bruit.   Cardiovascular:      Rate and Rhythm: Normal rate and regular rhythm.      Pulses: Normal pulses.      Heart sounds: Normal heart sounds. No murmur heard.  No friction rub. No gallop.    Pulmonary:      Effort: Pulmonary effort is normal. No respiratory distress.      Breath sounds: Normal breath sounds. No stridor. No wheezing, rhonchi or rales.   Chest:      Chest wall: No tenderness.   Lymphadenopathy:      Cervical: No cervical adenopathy.   Neurological:      Mental Status: She is alert.          Assessment and Plan (including Health Maintenance)      Problem List  Smart Sets  Document Outside HM   :    Plan:   Atrovent  Azithromycin  Robitussin  Tylenol  Aspirin  Encouraged pt to resume compliance with antihypertension medication.     Health Maintenance Due   Topic Date Due    Hepatitis C Screening  Never done    COVID-19 Vaccine (1) Never done    Pneumococcal Vaccines (Age 0-64) (1 of 2 - PPSV23) Never done    HIV Screening  Never done    TETANUS VACCINE  Never done    Cervical Cancer Screening  Never done    Influenza Vaccine (1) Never done       Problem List Items Addressed This Visit        Other    Exposure to COVID-19 virus - Primary    Relevant Medications    azithromycin (ZITHROMAX Z-OFELIA) 250 MG tablet    ipratropium (ATROVENT) 42 mcg (0.06 %) nasal spray    acetaminophen (TYLENOL) 500 MG tablet    aspirin 81 MG Chew    dextromethorphan-guaiFENesin  mg/5 ml (ROBITUSSIN-DM)  mg/5 mL liquid    Other Relevant Orders    SARS " Coronavirus 2 Antigen, POCT (Completed)      Other Visit Diagnoses     COVID        Relevant Medications    azithromycin (ZITHROMAX Z-OFELIA) 250 MG tablet    ipratropium (ATROVENT) 42 mcg (0.06 %) nasal spray    acetaminophen (TYLENOL) 500 MG tablet    aspirin 81 MG Chew    dextromethorphan-guaiFENesin  mg/5 ml (ROBITUSSIN-DM)  mg/5 mL liquid        Exposure to COVID-19 virus  -     SARS Coronavirus 2 Antigen, POCT  -     azithromycin (ZITHROMAX Z-OFELIA) 250 MG tablet; 2 tabs by mouth today. Then starting tomorrow 1 tab by mouth daily for 4 days.  Dispense: 6 tablet; Refill: 0  -     ipratropium (ATROVENT) 42 mcg (0.06 %) nasal spray; 2 sprays by Nasal route 3 (three) times daily.  Dispense: 15 mL; Refill: 1  -     acetaminophen (TYLENOL) 500 MG tablet; Take 2 tablets (1,000 mg total) by mouth every 8 (eight) hours as needed for Pain.  Dispense: 60 tablet; Refill: 0  -     aspirin 81 MG Chew; Take 1 tablet (81 mg total) by mouth once daily.  Dispense: 30 tablet; Refill: 0  -     dextromethorphan-guaiFENesin  mg/5 ml (ROBITUSSIN-DM)  mg/5 mL liquid; Take 5 mLs by mouth every 6 (six) hours as needed (cough).  Dispense: 236 mL; Refill: 0    COVID  -     azithromycin (ZITHROMAX Z-OFELIA) 250 MG tablet; 2 tabs by mouth today. Then starting tomorrow 1 tab by mouth daily for 4 days.  Dispense: 6 tablet; Refill: 0  -     ipratropium (ATROVENT) 42 mcg (0.06 %) nasal spray; 2 sprays by Nasal route 3 (three) times daily.  Dispense: 15 mL; Refill: 1  -     acetaminophen (TYLENOL) 500 MG tablet; Take 2 tablets (1,000 mg total) by mouth every 8 (eight) hours as needed for Pain.  Dispense: 60 tablet; Refill: 0  -     aspirin 81 MG Chew; Take 1 tablet (81 mg total) by mouth once daily.  Dispense: 30 tablet; Refill: 0  -     dextromethorphan-guaiFENesin  mg/5 ml (ROBITUSSIN-DM)  mg/5 mL liquid; Take 5 mLs by mouth every 6 (six) hours as needed (cough).  Dispense: 236 mL; Refill: 0       The patient has  no Health Maintenance topics of status Not Due    Procedures     Future Appointments   Date Time Provider Department Center   2/25/2022  9:45 AM Leonor Lovett MD [facility]        No follow-ups on file.       Signature:  Giles Oneill DO    Date of encounter: 1/17/22    Education Documentation  No documentation found.  Education Comments  No comments found.       There are no Patient Instructions on file for this visit.

## 2022-01-20 ENCOUNTER — TELEPHONE (OUTPATIENT)
Dept: FAMILY MEDICINE | Facility: CLINIC | Age: 37
End: 2022-01-20
Payer: COMMERCIAL

## 2022-01-20 NOTE — LETTER
January 20, 2022      [facility] [address] [place] [address]  Phone: [phone]  Fax: [phone]       Patient: Earline Lemus   YOB: 1985  Date of Visit: 01/17/2022    To Whom It May Concern:    Aquilino Lemus  was at [facility] on 01/17/2022. The patient may return to work/school on 01/21/2022 with no restrictions. If you have any questions or concerns, or if I can be of further assistance, please do not hesitate to contact me.    Sincerely,    Kathy Scott RN

## 2022-01-27 ENCOUNTER — OFFICE VISIT (OUTPATIENT)
Dept: FAMILY MEDICINE | Facility: CLINIC | Age: 37
End: 2022-01-27
Payer: COMMERCIAL

## 2022-01-27 VITALS
SYSTOLIC BLOOD PRESSURE: 130 MMHG | WEIGHT: 194.63 LBS | HEART RATE: 106 BPM | BODY MASS INDEX: 32.38 KG/M2 | TEMPERATURE: 98 F | DIASTOLIC BLOOD PRESSURE: 82 MMHG | OXYGEN SATURATION: 99 % | RESPIRATION RATE: 20 BRPM

## 2022-01-27 DIAGNOSIS — J18.9 PNEUMONIA OF RIGHT MIDDLE LOBE DUE TO INFECTIOUS ORGANISM: Primary | ICD-10-CM

## 2022-01-27 DIAGNOSIS — R05.9 COUGH: ICD-10-CM

## 2022-01-27 DIAGNOSIS — I10 PRIMARY HYPERTENSION: ICD-10-CM

## 2022-01-27 DIAGNOSIS — J01.00 ACUTE NON-RECURRENT MAXILLARY SINUSITIS: ICD-10-CM

## 2022-01-27 PROBLEM — U07.1 COVID: Status: RESOLVED | Noted: 2022-01-17 | Resolved: 2022-01-27

## 2022-01-27 PROCEDURE — 3075F SYST BP GE 130 - 139MM HG: CPT | Mod: CPTII,,, | Performed by: NURSE PRACTITIONER

## 2022-01-27 PROCEDURE — 4010F ACE/ARB THERAPY RXD/TAKEN: CPT | Mod: CPTII,,, | Performed by: NURSE PRACTITIONER

## 2022-01-27 PROCEDURE — 3008F PR BODY MASS INDEX (BMI) DOCUMENTED: ICD-10-PCS | Mod: CPTII,,, | Performed by: NURSE PRACTITIONER

## 2022-01-27 PROCEDURE — 96372 PR INJECTION,THERAP/PROPH/DIAG2ST, IM OR SUBCUT: ICD-10-PCS | Mod: ,,, | Performed by: NURSE PRACTITIONER

## 2022-01-27 PROCEDURE — 99214 OFFICE O/P EST MOD 30 MIN: CPT | Mod: 25,,, | Performed by: NURSE PRACTITIONER

## 2022-01-27 PROCEDURE — 1159F MED LIST DOCD IN RCRD: CPT | Mod: CPTII,,, | Performed by: NURSE PRACTITIONER

## 2022-01-27 PROCEDURE — 4010F PR ACE/ARB THEARPY RXD/TAKEN: ICD-10-PCS | Mod: CPTII,,, | Performed by: NURSE PRACTITIONER

## 2022-01-27 PROCEDURE — 96372 THER/PROPH/DIAG INJ SC/IM: CPT | Mod: ,,, | Performed by: NURSE PRACTITIONER

## 2022-01-27 PROCEDURE — 3079F PR MOST RECENT DIASTOLIC BLOOD PRESSURE 80-89 MM HG: ICD-10-PCS | Mod: CPTII,,, | Performed by: NURSE PRACTITIONER

## 2022-01-27 PROCEDURE — 3008F BODY MASS INDEX DOCD: CPT | Mod: CPTII,,, | Performed by: NURSE PRACTITIONER

## 2022-01-27 PROCEDURE — 3075F PR MOST RECENT SYSTOLIC BLOOD PRESS GE 130-139MM HG: ICD-10-PCS | Mod: CPTII,,, | Performed by: NURSE PRACTITIONER

## 2022-01-27 PROCEDURE — 1159F PR MEDICATION LIST DOCUMENTED IN MEDICAL RECORD: ICD-10-PCS | Mod: CPTII,,, | Performed by: NURSE PRACTITIONER

## 2022-01-27 PROCEDURE — 99214 PR OFFICE/OUTPT VISIT, EST, LEVL IV, 30-39 MIN: ICD-10-PCS | Mod: 25,,, | Performed by: NURSE PRACTITIONER

## 2022-01-27 PROCEDURE — 3079F DIAST BP 80-89 MM HG: CPT | Mod: CPTII,,, | Performed by: NURSE PRACTITIONER

## 2022-01-27 RX ORDER — CHLORPHENIRAMINE MALEATE AND PHENYLEPHRINE HYDROCHLORIDE 4; 10 MG/1; MG/1
1 TABLET, COATED ORAL EVERY 6 HOURS PRN
Qty: 30 TABLET | Refills: 0 | Status: SHIPPED | OUTPATIENT
Start: 2022-01-27 | End: 2022-02-06

## 2022-01-27 RX ORDER — CLARITHROMYCIN 500 MG/1
500 TABLET, FILM COATED ORAL EVERY 12 HOURS
Qty: 14 TABLET | Refills: 0 | Status: SHIPPED | OUTPATIENT
Start: 2022-01-27 | End: 2022-07-15

## 2022-01-27 RX ORDER — CEFTRIAXONE 1 G/1
1 INJECTION, POWDER, FOR SOLUTION INTRAMUSCULAR; INTRAVENOUS
Status: COMPLETED | OUTPATIENT
Start: 2022-01-27 | End: 2022-01-27

## 2022-01-27 RX ORDER — TELMISARTAN 40 MG/1
40 TABLET ORAL DAILY
Qty: 90 TABLET | Refills: 0 | Status: SHIPPED | OUTPATIENT
Start: 2022-01-27 | End: 2023-01-01 | Stop reason: SDUPTHER

## 2022-01-27 RX ORDER — PROPRANOLOL HYDROCHLORIDE 60 MG/1
60 CAPSULE, EXTENDED RELEASE ORAL NIGHTLY
Qty: 90 CAPSULE | Refills: 1 | Status: SHIPPED | OUTPATIENT
Start: 2022-01-27 | End: 2022-07-15

## 2022-01-27 RX ORDER — DEXAMETHASONE SODIUM PHOSPHATE 4 MG/ML
4 INJECTION, SOLUTION INTRA-ARTICULAR; INTRALESIONAL; INTRAMUSCULAR; INTRAVENOUS; SOFT TISSUE
Status: COMPLETED | OUTPATIENT
Start: 2022-01-27 | End: 2022-01-27

## 2022-01-27 RX ORDER — BENZONATATE 100 MG/1
100 CAPSULE ORAL 3 TIMES DAILY PRN
Qty: 30 CAPSULE | Refills: 1 | Status: SHIPPED | OUTPATIENT
Start: 2022-01-27 | End: 2022-02-06

## 2022-01-27 RX ADMIN — DEXAMETHASONE SODIUM PHOSPHATE 4 MG: 4 INJECTION, SOLUTION INTRA-ARTICULAR; INTRALESIONAL; INTRAMUSCULAR; INTRAVENOUS; SOFT TISSUE at 02:01

## 2022-01-27 RX ADMIN — CEFTRIAXONE 1 G: 1 INJECTION, POWDER, FOR SOLUTION INTRAMUSCULAR; INTRAVENOUS at 02:01

## 2022-01-27 NOTE — PROGRESS NOTES
Shira Harry NP   [facility]  [address]  [place]     PATIENT NAME: Earline Lemus  : 1985  DATE: 22  MRN: [id number]      Billing Provider: Shira Harry NP  Level of Service:   Patient PCP Information     Provider PCP Type    Shira Harry NP General          Reason for Visit / Chief Complaint: Sinus Problem (Sinus congestion, chest and ears feels full)       Update PCP  Update Chief Complaint         History of Present Illness / Problem Focused Workflow     Earline Lemus presents to the clinic pt is c/o sinus problem and ears feeling full, also stated that she has a lot of chest congestion  , also here for htn eval, does not take meds as ordered.    Review of Systems     Review of Systems   Constitutional: Negative for chills, fatigue and fever.   HENT: Positive for nasal congestion and sinus pressure/congestion. Negative for ear pain, facial swelling, hearing loss, mouth dryness, mouth sores, postnasal drip, rhinorrhea and goiter.    Eyes: Negative for discharge and itching.   Respiratory: Positive for cough and chest tightness. Negative for shortness of breath and wheezing.    Cardiovascular: Negative for chest pain and leg swelling.   Gastrointestinal: Negative for abdominal pain, change in bowel habit and change in bowel habit.   Genitourinary: Negative for difficulty urinating, dysuria, enuresis, frequency, hematuria and urgency.   Neurological: Negative for dizziness, vertigo, syncope, weakness and headaches.   Psychiatric/Behavioral: Negative for decreased concentration.        Medical / Social / Family History     Past Medical History:   Diagnosis Date    ADHD (attention deficit hyperactivity disorder)     Anxiety     Depression     Hypertension        Past Surgical History:   Procedure Laterality Date    TUBAL LIGATION         Social History  Ms.  reports that she has been smoking cigarettes and vaping with nicotine. She has been smoking about 0.50 packs per day.  She has never used smokeless tobacco. She reports current alcohol use. She reports that she does not use drugs.    Family History  Ms.'s family history includes Heart disease in her father.    Medications and Allergies     Medications  Outpatient Medications Marked as Taking for the 1/27/22 encounter (Office Visit) with Shira Harry NP   Medication Sig Dispense Refill    acetaminophen (TYLENOL) 500 MG tablet Take 2 tablets (1,000 mg total) by mouth every 8 (eight) hours as needed for Pain. 60 tablet 0    aspirin 81 MG Chew Take 1 tablet (81 mg total) by mouth once daily. 30 tablet 0    dextroamphetamine-amphetamine (ADDERALL XR) 15 MG 24 hr capsule Take 1 capsule by mouth once daily.      dextromethorphan-guaiFENesin  mg/5 ml (ROBITUSSIN-DM)  mg/5 mL liquid Take 5 mLs by mouth every 6 (six) hours as needed (cough). 236 mL 0    diazePAM (VALIUM) 5 MG tablet TAKE 1/2 TABLET BY MOUTH TWICE DAILY FOR ANXIETY OR TAKE ONE TABLET AT BEDTIME FOR SLEEP      doxycycline (VIBRAMYCIN) 100 MG Cap Take 1 capsule (100 mg total) by mouth 2 (two) times daily. 60 capsule 2    EScitalopram oxalate (LEXAPRO) 20 MG tablet Take 1 tablet by mouth once daily.      furosemide (LASIX) 20 MG tablet Take 1 tablet (20 mg total) by mouth once daily. 30 tablet 0    hydrOXYzine pamoate (VISTARIL) 50 MG Cap Take 1-2 capsules by mouth every evening.      ipratropium (ATROVENT) 42 mcg (0.06 %) nasal spray 2 sprays by Nasal route 3 (three) times daily. 15 mL 1    omeprazole (PRILOSEC) 20 MG capsule Take 1 capsule (20 mg total) by mouth once daily. 90 capsule 0    [DISCONTINUED] propranoloL (INDERAL LA) 60 MG 24 hr capsule Take 1 capsule by mouth every evening.      [DISCONTINUED] telmisartan (MICARDIS) 40 MG Tab Take 1 tablet (40 mg total) by mouth once daily. 90 tablet 0     Current Facility-Administered Medications for the 1/27/22 encounter (Office Visit) with hSira Harry NP   Medication Dose Route Frequency Provider  Last Rate Last Admin    [COMPLETED] cefTRIAXone injection 1 g  1 g Intramuscular 1 time in Clinic/HOD Shira Harry NP   1 g at 01/27/22 1410    [COMPLETED] dexamethasone injection 4 mg  4 mg Intramuscular 1 time in Clinic/HOD Shira Harry NP   4 mg at 01/27/22 1410       Allergies  Review of patient's allergies indicates:  No Known Allergies    Physical Examination     Vitals:    01/27/22 1350 01/27/22 1353   BP: (!) 148/88 130/82   Pulse: 106    Resp: 20    Temp: 98.2 °F (36.8 °C)    SpO2: 99%    Weight: 88.3 kg (194 lb 9.6 oz)       Physical Exam  Constitutional:       Appearance: Normal appearance.   HENT:      Head: Normocephalic.      Right Ear: Tympanic membrane, ear canal and external ear normal.      Left Ear: Tympanic membrane, ear canal and external ear normal.      Nose: Congestion present.      Comments: Mod amt thick yellow nasal secretion, pressure over max region     Mouth/Throat:      Mouth: Mucous membranes are moist.   Eyes:      Extraocular Movements: Extraocular movements intact.      Conjunctiva/sclera: Conjunctivae normal.      Pupils: Pupils are equal, round, and reactive to light.   Neck:      Comments: Gianluca ant cervical nodes  Cardiovascular:      Rate and Rhythm: Normal rate and regular rhythm.      Pulses: Normal pulses.      Heart sounds: Normal heart sounds.   Pulmonary:      Effort: Pulmonary effort is normal.      Breath sounds: Normal breath sounds.   Musculoskeletal:         General: Normal range of motion.      Cervical back: Normal range of motion.   Lymphadenopathy:      Cervical: Cervical adenopathy present.   Skin:     General: Skin is warm and dry.   Neurological:      General: No focal deficit present.      Mental Status: She is alert and oriented to person, place, and time.   Psychiatric:         Behavior: Behavior normal.          Assessment and Plan (including Health Maintenance)      Problem List  Smart Sets  Document Outside HM   :    Plan: stressed to take meds as  ordered, needs fasting labs asap, avoid irritants, stop smoking, [facility] then 6months for bp eval    Primary hypertension  -     propranoloL (INDERAL LA) 60 MG 24 hr capsule; Take 1 capsule (60 mg total) by mouth every evening.  Dispense: 90 capsule; Refill: 1  -     telmisartan (MICARDIS) 40 MG Tab; Take 1 tablet (40 mg total) by mouth once daily.  Dispense: 90 tablet; Refill: 0    Cough  -     X-Ray Chest PA And Lateral; Future; Expected date: 01/27/2022    Acute non-recurrent maxillary sinusitis  -     benzonatate (TESSALON) 100 MG capsule; Take 1 capsule (100 mg total) by mouth 3 (three) times daily as needed for Cough.  Dispense: 30 capsule; Refill: 1  -     clarithromycin (BIAXIN) 500 MG tablet; Take 1 tablet (500 mg total) by mouth every 12 (twelve) hours.  Dispense: 14 tablet; Refill: 0  -     chlorpheniramine-phenylephrine (ED A-HIST) 4-10 mg per tablet; Take 1 tablet by mouth every 6 (six) hours as needed for Congestion.  Dispense: 30 tablet; Refill: 0    Other orders  -     dexamethasone injection 4 mg  -     cefTRIAXone injection 1 g            Health Maintenance Due   Topic Date Due    Hepatitis C Screening  Never done    COVID-19 Vaccine (1) Never done    Pneumococcal Vaccines (Age 0-64) (1 of 2 - PPSV23) Never done    HIV Screening  Never done    TETANUS VACCINE  Never done    Cervical Cancer Screening  Never done    Influenza Vaccine (1) Never done       Problem List Items Addressed This Visit        ENT    Acute non-recurrent maxillary sinusitis    Relevant Medications    benzonatate (TESSALON) 100 MG capsule    clarithromycin (BIAXIN) 500 MG tablet    chlorpheniramine-phenylephrine (ED A-HIST) 4-10 mg per tablet       Pulmonary    Cough    Relevant Orders    X-Ray Chest PA And Lateral       Cardiac/Vascular    Hypertension - Primary    Relevant Medications    propranoloL (INDERAL LA) 60 MG 24 hr capsule    telmisartan (MICARDIS) 40 MG Tab             The patient has no Health Maintenance topics of status Not Due    Procedures     Future Appointments   Date Time Provider Department Center   2/25/2022  9:45 AM Leonor Lovett MD [facility]        No follow-ups on file.       Signature:  Shira Harry NP    Date of encounter: 1/27/22

## 2022-01-27 NOTE — PATIENT INSTRUCTIONS
Stop smoking, avoid irritants, meds as ordered, needs repeat chest xray in 2 weeks with evaluation. Return to clinicn as needed    Patient Education       High Blood Pressure Discharge Instructions   About this topic   The medical name for high blood pressure is hypertension. Your blood pressure is measured with 2 numbers. For example, you may hear the staff say your blood pressure is 130 over 80. High blood pressure cannot be cured. You must control it with drugs and lifestyle changes. High blood pressure puts you at risk for heart attack, stroke, and kidney disease.         What care is needed at home?   · Ask your doctor what you need to do when you go home. Make sure you ask questions if you do not understand what the doctor says. This way you will know what you need to do.  · Take all your medicines as ordered. Do not stop taking any of your regular medicines without talking to your doctor.  · Learn how to check your blood pressure at home, if your doctor suggests you do this.  What follow-up care is needed?   Your doctor may ask you to make visits to the office to check on your progress. Be sure to keep these visits.  What drugs may be needed?   The doctor may order drugs to help control your high blood pressure. Take your drugs as ordered. Do not take other prescription drugs or over-the-counter (OTC) drugs without talking to your doctor first.  Will physical activity be limited?   Talk to your doctor about the right amount of activity for you. Exercise may help you lose weight and lower your blood pressure.  What changes to diet are needed?   · Do not use salt on your food. Use herbs and spices to improve the taste.  · Eat less than 1,500 mg of sodium a day. Read food labels to see how much sodium is in a food.  · Limit coffee, tea, and soda to 2 cups (480 mL) a day.  · Limit beer, wine, and mixed drinks (alcohol) to 1 drink a day for women and 2 drinks a day for men.  · Eat lots of fruits, vegetables, and  low-fat dairy products.  · Avoid fatty foods like fried foods or chips.  · Talk with your dietitian about the diet changes you need and the number of calories you should eat each day.  What problems could happen?   · Heart attack, heart failure, or other heart problems  · Stroke  · Swelling of blood vessels  · Kidney failure  · Loss of eyesight  · Memory problems  · Fluid in the lungs  · Death  What can be done to prevent this health problem?   · Exercise regularly. Try to get at least 30 minutes of exercise most days of the week.  · Avoid weight gain.  · Stop smoking. Your doctor can tell you about stop smoking programs.  · Learn to lower stress.  When do I need to call the doctor?   · You have signs of a heart attack, which may include:  ? Severe chest pain, pressure, or discomfort with:  § Breathing trouble, sweating, upset stomach, or cold, clammy skin  § Pain in your arms, back, or jaw  § Worse pain with activity like walking up stairs  ? Fast or irregular heartbeat  ? Feeling dizzy, faint, or weak  · You have signs of stroke like sudden:  ? Numbness or weakness of the face, arm, or leg, especially on one side of the body  ? Confusion, trouble speaking, or understanding  ? Trouble seeing in one or both eyes  ? Trouble walking, dizziness, loss of balance, or coordination  ? Severe headache with no known cause  · You have a seizure or pass out.  · You have a severe headache with an upset stomach or throwing up.  · You have sudden, severe back pain.  · You have 2 home blood pressure readings higher than 180/120.  · Your urine is brown or bloody.  Teach Back: Helping You Understand   The Teach Back Method helps you understand the information we are giving you. After you talk with the staff, tell them in your own words what you learned. This helps to make sure the staff has described each thing clearly. It also helps to explain things that may have been confusing. Before going home, make sure you can do these:  · I  can tell you about my condition.  · I can tell you what numbers are too high for my blood pressure.  · I can tell you what I will do if I have signs of a heart attack or stroke.  Where can I learn more?   American Academy of Family Physicians  https://familydoctor.org/condition/high-blood-pressure/   American Academy of Family Physicians  https://familydoctor.org/the-dash-diet-healthy-eating-to-control-your-blood-pressure/   American Heart Association  http://www.heart.org/HEARTORG/Conditions/HighBloodPressure/AboutHighBloodPressure/About-High-Blood-Pressure_UC_002050_Article.jsp#.C8tO5VbyH5h   NHS Choices  http://www.nhs.uk/conditions/blood-pressure-(high)/pages/introduction.aspx   [employer]  https://www.uptodate.com/contents/high-blood-pressure-in-adults-beyond-the-basics?source=see_link   Last Reviewed Date   2021-06-08  Consumer Information Use and Disclaimer   This information is not specific medical advice and does not replace information you receive from your health care provider. This is only a brief summary of general information. It does NOT include all information about conditions, illnesses, injuries, tests, procedures, treatments, therapies, discharge instructions or life-style choices that may apply to you. You must talk with your health care provider for complete information about your health and treatment options. This information should not be used to decide whether or not to accept your health care providers advice, instructions or recommendations. Only your health care provider has the knowledge and training to provide advice that is right for you.  Copyright   Copyright © 2021 [employer] and its affiliates and/or licensors. All rights reserved.  Patient Education       Sinusitis Discharge Instructions, Adult   About this topic   Your sinuses are hollow areas in the bones of your face. They have a thin lining that normally makes a small amount of mucus. When you have sinusitis, the lining  gets swollen and makes extra mucus. You may have sinusitis with or after a cold. Most of the time sinusitis will get better in 1 to 2 weeks.  Sinusitis is most often caused by a virus, so antibiotics wont help. But some people do need antibiotics. If the doctor ordered antibiotics for you, be sure to follow the instructions. It is important to take all of your antibiotics even if you start to feel better.       What care is needed at home?   · Ask your doctor what you need to do when you go home. Make sure you ask questions if you do not understand what you need to do.  · Try to thin the mucus.  ? Drink lots of liquids to stay hydrated.  ? Use a cool mist humidifier to avoid dry air.  ? Use saline nose drops or a saline nose rinse to relieve stuffiness.  · Wash your hands often. This will help keep others healthy.  · Do not smoke or be in smoke-filled places. Avoid things that may cause breathing problems like fumes, pollution, dust, and other common allergens and irritants.  · You may want to take medicine like ibuprofen, naproxen, or acetaminophen to help with pain.  What follow-up care is needed?   · Your doctor may ask you to make visits to the office to check on your progress. Be sure to keep your visits.  · Your doctor will tell you if other tests are needed.  · Your doctor may send you for allergy tests or to an allergy expert.   What lifestyle changes are needed?   · Avoid drinks that contain caffeine or alcohol.  · Try to stop smoking. Avoid being around others who smoke. Smoke can damage the small hairs inside your sinuses.  What drugs may be needed?   The doctor may order drugs to:  · Help with pain and swelling  · Fight an infection  · Control coughing  · Dry up the sinuses  · Help a runny or stuffy nose  Will physical activity be limited?   You do not have to limit your activity. You may want to rest more if you have a fever or headache.   What problems could happen?   · Infections that happen again  and again  · Asthma attack  · Coughing  · Loss of voice  What can be done to prevent this health problem?   · Keep your nose as moist as possible. Use saline sprays, washes, and a humidifier often.  · Avoid being around cigarette and cigar smoke or strong odors from chemicals.  · Avoid long periods of swimming in pools treated with chlorine. This can bother the lining of the nose and sinuses.  · Avoid water diving. This forces water into the sinuses from the nasal passages.  · Manage your allergies with your doctor's help.  · Use an air conditioner if allergies are a problem.  · Before air travel, use a drug to dry up mucus. As the plane takes off or lands, the pressure can cause sinus pain.  When do I need to call the doctor?   · You have a stiff neck, especially if you also have fever, chills, vomiting, or severe headache  · You have trouble thinking clearly.  · You have trouble seeing or have double vision.  · You have swelling or redness or pain around one or both eyes.  · You have a fever of 102°F (38.9°C) or higher, or have shaking chills or sweats.  · You have an upset stomach and throwing up.  · You have more pain in your face and head.  · You are not getting better within 1 to 2 weeks.  Teach Back: Helping You Understand   The Teach Back Method helps you understand the information we are giving you. After you talk with the staff, tell them in your own words what you learned. This helps to make sure the staff has covered each thing clearly. It also helps to explain things that may have been confusing. Before going home, make sure you can do these:  · I can tell you about my condition.  · I can tell you what may help ease my breathing.  · I can tell you what I will do if I have a fever, chills, or trouble breathing.  Where can I learn more?   American Academy of Family Physicians  https://familydoctor.org/condition/sinusitis/   Centers for Disease Control and  Prevention  https://www.cdc.gov/antibiotic-use/community/for-hcp/outpatient-hcp/adult-treatment-rec.html   Last Reviewed Date   2021-06-09  Consumer Information Use and Disclaimer   This information is not specific medical advice and does not replace information you receive from your health care provider. This is only a brief summary of general information. It does NOT include all information about conditions, illnesses, injuries, tests, procedures, treatments, therapies, discharge instructions or life-style choices that may apply to you. You must talk with your health care provider for complete information about your health and treatment options. This information should not be used to decide whether or not to accept your health care providers advice, instructions or recommendations. Only your health care provider has the knowledge and training to provide advice that is right for you.  Copyright   Copyright © 2021 [employer]. and its affiliates and/or licensors. All rights reserved.  Patient Education       Pneumonia Discharge Instructions, Adult   About this topic   Pneumonia is an infection in your lungs. It is most often caused by bacteria and viruses. You may develop a fever, cough, have trouble breathing, feel weak, or have chest pain. Pneumonia can cause you to need help breathing with a machine called a ventilator. In some cases, pneumonia can even cause death.           What care is needed at home?   · Ask your doctor what you need to do when you go home. Make sure you ask questions if you do not understand what the doctor says.  · The doctor may have ordered antibiotics to treat an infection. It is important to take all your antibiotics even if you start to feel better.  · If you smoke, try to quit. Your doctor or nurse can help you with this.  · Stay away from smoke-filled places. Avoid other things that may cause breathing problems like fumes, pollution, dust, and other common allergens.  · If you  have an inhaler to take when you are feeling short of breath, be sure to carry it with you all the time. Then, you can take it when needed. Take all your other medicines as directed.  · Drink lots of water, juice, or broth to replace fluids lost through runny nose and fever.  · Take warm, steamy showers to help soothe the cough.  · Use a cool mist humidifier. This will make it easier to breathe.  · Use hard candy or cough drops to soothe sore throat and cough.  · Wash your hands often. This will help keep others healthy.  What follow-up care is needed?   Your doctor may ask you to make visits to the office to check on your progress. Be sure to keep these visits.  What drugs may be needed?   The doctor may order drugs to:  · Treat infection  · Loosen secretions  · Lower fever  · Control coughing  · Open the airways  · Help with swelling in your airways and lungs  You may be given inhalers to help your breathing. Talk with your doctor about how to take all of your drugs.  Will physical activity be limited?   · Get enough rest while recovering from your illness.  · Talk with your doctor about when you can return to your normal activities.  What can be done to prevent this health problem?   · Always cover your cough with the inside of your arm.  · Wash your hands often with soap and water for at least 20 seconds, especially after coughing or sneezing. Alcohol-based hand sanitizers also work.  · Do not get too close (kissing, hugging) to people who are sick. Ask visitors who have colds to wear a mask.  · If you have a cold, stay home from work or school. Wear a mask to help from spreading the infection.  · Do not share towels or hankies with anyone who is sick.  · Eat a healthy diet.  · Get a flu shot each year. Ask your doctor if you need a pneumonia shot or any other vaccines.  · Do not smoke or be around smoke or vape.  · Avoid drinking too much alcohol. Too much alcohol can lower your ability to fight off  pneumonia.  When do I need to call the doctor?   · You are having so much trouble breathing that you can only say one or two words at a time.  · You need to sit upright at all times to be able to breathe and/or cannot lie down.  · You have trouble breathing when talking or sitting still.  · Your shortness of breath has not gotten better after a few days.  · You are coughing up blood.  · You have a fever of 100.4°F (38°C) or higher or chills, even after taking your medicines.  · Your symptoms are not getting better in 3 to 4 days.  · You are still coughing in 3 to 4 weeks.  Teach Back: Helping You Understand   The Teach Back Method helps you understand the information we are giving you. After you talk with the staff, tell them in your own words what you learned. This helps to make sure the staff has described each thing clearly. It also helps to explain things that may have been confusing. Before going home, make sure you can do these:  · I can tell you about my condition.  · I can tell you what I can do to help avoid passing the infection to others.  · I can tell you what I will do if I have more trouble breathing, feel short of breath at rest, or my cough does not get better.  Where can I learn more?   American Lung Association  http://www.lungusa.org/lung-disease/pneumonia/understanding-pneumonia.html   Centers for Disease Control and Prevention  https://www.cdc.gov/pneumonia/index.html   FamilyDoctor.org  https://familydoctor.org/condition/pneumonia/   NHS Choices  https://www.nhs.uk/conditions/pneumonia/   Last Reviewed Date   2021-06-07  Consumer Information Use and Disclaimer   This information is not specific medical advice and does not replace information you receive from your health care provider. This is only a brief summary of general information. It does NOT include all information about conditions, illnesses, injuries, tests, procedures, treatments, therapies, discharge instructions or life-style  choices that may apply to you. You must talk with your health care provider for complete information about your health and treatment options. This information should not be used to decide whether or not to accept your health care providers advice, instructions or recommendations. Only your health care provider has the knowledge and training to provide advice that is right for you.  Copyright   Copyright © 2021 [employer] and its affiliates and/or licensors. All rights reserved.

## 2022-01-28 RX ORDER — PREDNISONE 5 MG/1
5 TABLET, DELAYED RELEASE ORAL NIGHTLY
COMMUNITY
End: 2022-07-15

## 2022-02-09 ENCOUNTER — OFFICE VISIT (OUTPATIENT)
Dept: FAMILY MEDICINE | Facility: CLINIC | Age: 37
End: 2022-02-09
Payer: COMMERCIAL

## 2022-02-09 VITALS
BODY MASS INDEX: 32.32 KG/M2 | TEMPERATURE: 98 F | HEART RATE: 99 BPM | RESPIRATION RATE: 20 BRPM | WEIGHT: 194 LBS | SYSTOLIC BLOOD PRESSURE: 130 MMHG | OXYGEN SATURATION: 98 % | DIASTOLIC BLOOD PRESSURE: 70 MMHG | HEIGHT: 65 IN

## 2022-02-09 DIAGNOSIS — J18.9 PNEUMONIA OF RIGHT MIDDLE LOBE DUE TO INFECTIOUS ORGANISM: Primary | ICD-10-CM

## 2022-02-09 PROBLEM — J01.00 ACUTE NON-RECURRENT MAXILLARY SINUSITIS: Status: RESOLVED | Noted: 2022-01-27 | Resolved: 2022-02-09

## 2022-02-09 PROBLEM — J32.0 MAXILLARY SINUSITIS: Status: RESOLVED | Noted: 2021-08-11 | Resolved: 2022-02-09

## 2022-02-09 PROCEDURE — 1159F PR MEDICATION LIST DOCUMENTED IN MEDICAL RECORD: ICD-10-PCS | Mod: CPTII,,, | Performed by: NURSE PRACTITIONER

## 2022-02-09 PROCEDURE — 3075F PR MOST RECENT SYSTOLIC BLOOD PRESS GE 130-139MM HG: ICD-10-PCS | Mod: CPTII,,, | Performed by: NURSE PRACTITIONER

## 2022-02-09 PROCEDURE — 3078F DIAST BP <80 MM HG: CPT | Mod: CPTII,,, | Performed by: NURSE PRACTITIONER

## 2022-02-09 PROCEDURE — 3008F PR BODY MASS INDEX (BMI) DOCUMENTED: ICD-10-PCS | Mod: CPTII,,, | Performed by: NURSE PRACTITIONER

## 2022-02-09 PROCEDURE — 1159F MED LIST DOCD IN RCRD: CPT | Mod: CPTII,,, | Performed by: NURSE PRACTITIONER

## 2022-02-09 PROCEDURE — 3078F PR MOST RECENT DIASTOLIC BLOOD PRESSURE < 80 MM HG: ICD-10-PCS | Mod: CPTII,,, | Performed by: NURSE PRACTITIONER

## 2022-02-09 PROCEDURE — 3008F BODY MASS INDEX DOCD: CPT | Mod: CPTII,,, | Performed by: NURSE PRACTITIONER

## 2022-02-09 PROCEDURE — 99214 PR OFFICE/OUTPT VISIT, EST, LEVL IV, 30-39 MIN: ICD-10-PCS | Mod: ,,, | Performed by: NURSE PRACTITIONER

## 2022-02-09 PROCEDURE — 4010F PR ACE/ARB THEARPY RXD/TAKEN: ICD-10-PCS | Mod: CPTII,,, | Performed by: NURSE PRACTITIONER

## 2022-02-09 PROCEDURE — 99214 OFFICE O/P EST MOD 30 MIN: CPT | Mod: ,,, | Performed by: NURSE PRACTITIONER

## 2022-02-09 PROCEDURE — 3075F SYST BP GE 130 - 139MM HG: CPT | Mod: CPTII,,, | Performed by: NURSE PRACTITIONER

## 2022-02-09 PROCEDURE — 4010F ACE/ARB THERAPY RXD/TAKEN: CPT | Mod: CPTII,,, | Performed by: NURSE PRACTITIONER

## 2022-02-09 NOTE — PATIENT INSTRUCTIONS
"Needs to have fasting blood work done asap, and f/u in 6 monhtsPatient Education       Why Vaccines Are Important for Everyone   About this topic   Vaccines have saved many people from death or serious harm. Some people call vaccines shots or baby shots. A vaccine is a way of teaching your body how to fight the germs that cause infections. Vaccines are made from weakened or dead germs, or just parts of a germ. Vaccines are most often given as a shot, but they may also be a nose spray or a medicine that you swallow. Often a vaccine is given before the body is exposed to the germ. If the germs get into your body later in life, your body recognizes them. Then the immune system is ready to fight off the germs.  Vaccines can often prevent many kinds of illnesses. Influenza or the "flu" and pneumonia are 2 common illnesses that have vaccines. There are vaccines for measles, mumps, and some kinds of hepatitis. Tetanus and polio also have vaccines. COVID-19 vaccines are also now available.  The vaccines you need are based on your age, health, and lifestyle. Young children get many vaccines before they are 5 years old. Some vaccines, like the shingles vaccine, are just for older people. When you travel, you may need special vaccines.  Some vaccines come in just one dose. Other vaccines need 2 or more doses for the person to be fully protected. A tetanus shot is a kind of vaccine that you have to get more than one time.  General   The Centers for Disease Control and Prevention (CDC) says there are things that you should know about vaccines:  · Vaccines are important for all people. They help protect you and others from dangerous illnesses.  · Vaccines protect the one who gets the shot and others too. People who are most likely to become sick are those with weak immune systems and babies who are too young for vaccines. They are exposed to fewer illnesses when most people in the community have gotten vaccines.  · Some vaccines " come in just one dose. Other vaccines need 2 or more doses for the person to be fully protected. Ask your doctor how many vaccines you need for each kind of disease. Some vaccines, like the flu shot, you must get each year.  · Vaccines can have side effects, but almost all of these are minor. The risks are low and serious side effects are very rare. The benefits outweigh the risks of vaccines. Ask your doctor what you should do if you have a reaction to a vaccine.  · Keep a record of your vaccines. If you change doctors, bring the record to the next doctor.  · Visit your local hospitals, health centers, or [facility] health departments for free or low cost vaccines.  · Some pharmacies also offer vaccines.  What will the results be?   Vaccines help protect you against some illnesses. They may also help keep you healthy even if you are exposed to some germs.  What drugs may be needed?   The doctor may order drugs to:  · Lower fever  · Help with pain  Will there be any other care needed?   · When traveling outside the country, ask your doctor if you need any more vaccines.  · If you have a chronic illness like diabetes, ask your doctor if there are any special vaccines to get or any vaccines you need to skip.  What problems could happen?   Mild reactions are common. You may have:  · Redness and mild swelling where the shot was given  · A mild fever  · A mild rash  · Headaches or body aches  A mild reaction does not mean you are sick, just that your immune system is responding to the vaccine. High fever, rash, swollen glands, and joint pain are rare reactions that can happen. Serious reactions like a seizure or very bad allergic reaction do not happen often.  When do I need to call the doctor?   · Signs of a very bad reaction to the vaccine. These include wheezing; chest tightness; fever; itching; bad cough; blue skin color; seizures, or swelling of face, lips, tongue, or throat.  · High fever or behavior that is not  normal  · Seizure  Where can I learn more?   Centers for Disease Control and Prevention  https://www.cdc.gov/vaccines/adults/index.html   FamilyDoctor.org  https://familydoctor.org/the-importance-of-vaccinations/   Immunization Action Coalition  https://vaccineinformation.org/   Last Reviewed Date   2021-04-07  Consumer Information Use and Disclaimer   This information is not specific medical advice and does not replace information you receive from your health care provider. This is only a brief summary of general information. It does NOT include all information about conditions, illnesses, injuries, tests, procedures, treatments, therapies, discharge instructions or life-style choices that may apply to you. You must talk with your health care provider for complete information about your health and treatment options. This information should not be used to decide whether or not to accept your health care providers advice, instructions or recommendations. Only your health care provider has the knowledge and training to provide advice that is right for you.  Copyright   Copyright © 2021 [employer]. and its affiliates and/or licensors. All rights reserved.

## 2022-02-09 NOTE — PROGRESS NOTES
Shira Harry NP   [facility]  [address] [address]  [place]     PATIENT NAME: Earline Lemus  : 1985  DATE: 22  MRN: [id number]      Billing Provider: Shira Harry NP  Level of Service:   Patient PCP Information     Provider PCP Type    Shira Harry NP General          Reason for Visit / Chief Complaint: Follow-up (F/o pneumonia)       Update PCP  Update Chief Complaint         History of Present Illness / Problem Focused Workflow     Earline Lemus presents to the clinic   F/u pneumonia, staes she is doing better    Review of Systems     Review of Systems   Constitutional: Negative for chills, fatigue and fever.   HENT: Negative for nasal congestion, ear pain, facial swelling, hearing loss, mouth dryness, mouth sores, postnasal drip, rhinorrhea, sinus pressure/congestion and goiter.    Eyes: Negative for discharge and itching.   Respiratory: Negative for cough, shortness of breath and wheezing.    Cardiovascular: Negative for chest pain and leg swelling.   Gastrointestinal: Negative for abdominal pain, change in bowel habit and change in bowel habit.   Genitourinary: Negative for difficulty urinating, dysuria, enuresis, frequency, hematuria and urgency.   Neurological: Negative for dizziness, vertigo, syncope, weakness and headaches.   Psychiatric/Behavioral: Negative for decreased concentration.        Medical / Social / Family History     Past Medical History:   Diagnosis Date    ADHD (attention deficit hyperactivity disorder)     Anxiety     COVID 2022    Depression     Hypertension        Past Surgical History:   Procedure Laterality Date    TUBAL LIGATION         Social History  Ms.  reports that she has been smoking cigarettes and vaping with nicotine. She has been smoking about 0.50 packs per day. She has never used smokeless tobacco. She reports current alcohol use. She reports that she does not use drugs.    Family History  Ms.'s family history includes Heart disease in  "her father.    Medications and Allergies     Medications  Outpatient Medications Marked as Taking for the 2/9/22 encounter (Office Visit) with Shira Harry NP   Medication Sig Dispense Refill    aspirin 81 MG Chew Take 1 tablet (81 mg total) by mouth once daily. 30 tablet 0    clarithromycin (BIAXIN) 500 MG tablet Take 1 tablet (500 mg total) by mouth every 12 (twelve) hours. 14 tablet 0    dextroamphetamine-amphetamine (ADDERALL XR) 15 MG 24 hr capsule Take 1 capsule by mouth once daily.      diazePAM (VALIUM) 5 MG tablet TAKE 1/2 TABLET BY MOUTH TWICE DAILY FOR ANXIETY OR TAKE ONE TABLET AT BEDTIME FOR SLEEP      EScitalopram oxalate (LEXAPRO) 20 MG tablet Take 1 tablet by mouth once daily.      furosemide (LASIX) 20 MG tablet Take 1 tablet (20 mg total) by mouth once daily. 30 tablet 0    hydrOXYzine pamoate (VISTARIL) 50 MG Cap Take 1-2 capsules by mouth every evening.      ipratropium (ATROVENT) 42 mcg (0.06 %) nasal spray 2 sprays by Nasal route 3 (three) times daily. 15 mL 1    omeprazole (PRILOSEC) 20 MG capsule Take 1 capsule (20 mg total) by mouth once daily. 90 capsule 0    prednisone (LAUREL) 5 mg TbEC Take 5 mg by mouth every evening.      propranoloL (INDERAL LA) 60 MG 24 hr capsule Take 1 capsule (60 mg total) by mouth every evening. 90 capsule 1    telmisartan (MICARDIS) 40 MG Tab Take 1 tablet (40 mg total) by mouth once daily. 90 tablet 0       Allergies  Review of patient's allergies indicates:  No Known Allergies    Physical Examination     Vitals:    02/09/22 1611   BP: 130/70   BP Location: Right arm   Patient Position: Sitting   BP Method: Medium (Manual)   Pulse: 99   Resp: 20   Temp: 97.8 °F (36.6 °C)   TempSrc: Oral   SpO2: 98%   Weight: 88 kg (194 lb)   Height: 5' 5" (1.651 m)      Physical Exam  Constitutional:       Appearance: Normal appearance.   HENT:      Head: Normocephalic.      Right Ear: Tympanic membrane, ear canal and external ear normal.      Left Ear: Tympanic " membrane, ear canal and external ear normal.      Nose: Nose normal.      Mouth/Throat:      Mouth: Mucous membranes are moist.      Pharynx: Oropharynx is clear.   Eyes:      Extraocular Movements: Extraocular movements intact.      Conjunctiva/sclera: Conjunctivae normal.      Pupils: Pupils are equal, round, and reactive to light.   Cardiovascular:      Rate and Rhythm: Normal rate and regular rhythm.      Pulses: Normal pulses.      Heart sounds: Normal heart sounds.   Pulmonary:      Effort: Pulmonary effort is normal.      Breath sounds: Normal breath sounds.   Abdominal:      General: Bowel sounds are normal.      Palpations: Abdomen is soft.   Musculoskeletal:         General: Normal range of motion.   Skin:     General: Skin is warm and dry.      Capillary Refill: Capillary refill takes less than 2 seconds.   Neurological:      General: No focal deficit present.      Mental Status: She is alert and oriented to person, place, and time.   Psychiatric:         Mood and Affect: Mood normal.         Behavior: Behavior normal.          Assessment and Plan (including Health Maintenance)      Problem List  Smart Sets  Document Outside HM   :    Plan: stop smoking, meds as ordered, exercise daily, return to clinic as needed and as scheduled.     Pneumonia of right middle lobe due to infectious organism  -     X-Ray Chest PA And Lateral; Future; Expected date: 02/09/2022            Health Maintenance Due   Topic Date Due    Hepatitis C Screening  Never done    COVID-19 Vaccine (1) Never done    Pneumococcal Vaccines (Age 0-64) (1 of 2 - PPSV23) Never done    HIV Screening  Never done    TETANUS VACCINE  Never done    Cervical Cancer Screening  Never done    Influenza Vaccine (1) Never done       Problem List Items Addressed This Visit        Pulmonary    Pneumonia of right middle lobe due to infectious organism - Primary    Relevant Orders    X-Ray Chest PA And Lateral            The patient has no Health  Maintenance topics of status Not Due    Procedures     Future Appointments   Date Time Provider Department Center   2/25/2022  9:45 AM Leonor Lovett MD [facility]   8/9/2022  3:30 PM Shira Harry NP [facility] [place]        Follow up in about 6 months (around 8/9/2022) for f/u htn.       Signature:  Shira Harry NP    Date of encounter: 2/9/22

## 2022-02-24 RX ORDER — FUROSEMIDE 20 MG/1
20 TABLET ORAL DAILY
Qty: 30 TABLET | Refills: 0 | Status: SHIPPED | OUTPATIENT
Start: 2022-02-24 | End: 2022-07-29

## 2022-02-25 ENCOUNTER — OFFICE VISIT (OUTPATIENT)
Dept: DERMATOLOGY | Facility: CLINIC | Age: 37
End: 2022-02-25
Payer: COMMERCIAL

## 2022-02-25 VITALS — HEIGHT: 65 IN | WEIGHT: 194 LBS | BODY MASS INDEX: 32.32 KG/M2 | RESPIRATION RATE: 18 BRPM

## 2022-02-25 DIAGNOSIS — L71.9 ROSACEA: Primary | ICD-10-CM

## 2022-02-25 DIAGNOSIS — L56.4 POLYMORPHOUS LIGHT ERUPTION: ICD-10-CM

## 2022-02-25 PROCEDURE — 3008F BODY MASS INDEX DOCD: CPT | Mod: CPTII,,, | Performed by: STUDENT IN AN ORGANIZED HEALTH CARE EDUCATION/TRAINING PROGRAM

## 2022-02-25 PROCEDURE — 1160F PR REVIEW ALL MEDS BY PRESCRIBER/CLIN PHARMACIST DOCUMENTED: ICD-10-PCS | Mod: CPTII,,, | Performed by: STUDENT IN AN ORGANIZED HEALTH CARE EDUCATION/TRAINING PROGRAM

## 2022-02-25 PROCEDURE — 1159F PR MEDICATION LIST DOCUMENTED IN MEDICAL RECORD: ICD-10-PCS | Mod: CPTII,,, | Performed by: STUDENT IN AN ORGANIZED HEALTH CARE EDUCATION/TRAINING PROGRAM

## 2022-02-25 PROCEDURE — 4010F PR ACE/ARB THEARPY RXD/TAKEN: ICD-10-PCS | Mod: CPTII,,, | Performed by: STUDENT IN AN ORGANIZED HEALTH CARE EDUCATION/TRAINING PROGRAM

## 2022-02-25 PROCEDURE — 3008F PR BODY MASS INDEX (BMI) DOCUMENTED: ICD-10-PCS | Mod: CPTII,,, | Performed by: STUDENT IN AN ORGANIZED HEALTH CARE EDUCATION/TRAINING PROGRAM

## 2022-02-25 PROCEDURE — 1159F MED LIST DOCD IN RCRD: CPT | Mod: CPTII,,, | Performed by: STUDENT IN AN ORGANIZED HEALTH CARE EDUCATION/TRAINING PROGRAM

## 2022-02-25 PROCEDURE — 99214 OFFICE O/P EST MOD 30 MIN: CPT | Mod: ,,, | Performed by: STUDENT IN AN ORGANIZED HEALTH CARE EDUCATION/TRAINING PROGRAM

## 2022-02-25 PROCEDURE — 4010F ACE/ARB THERAPY RXD/TAKEN: CPT | Mod: CPTII,,, | Performed by: STUDENT IN AN ORGANIZED HEALTH CARE EDUCATION/TRAINING PROGRAM

## 2022-02-25 PROCEDURE — 1160F RVW MEDS BY RX/DR IN RCRD: CPT | Mod: CPTII,,, | Performed by: STUDENT IN AN ORGANIZED HEALTH CARE EDUCATION/TRAINING PROGRAM

## 2022-02-25 PROCEDURE — 99214 PR OFFICE/OUTPT VISIT, EST, LEVL IV, 30-39 MIN: ICD-10-PCS | Mod: ,,, | Performed by: STUDENT IN AN ORGANIZED HEALTH CARE EDUCATION/TRAINING PROGRAM

## 2022-02-25 RX ORDER — HYDROCORTISONE 25 MG/G
CREAM TOPICAL 2 TIMES DAILY
Qty: 20 G | Refills: 5 | Status: SHIPPED | OUTPATIENT
Start: 2022-02-25 | End: 2022-07-29

## 2022-02-25 RX ORDER — DOXYCYCLINE 100 MG/1
100 CAPSULE ORAL 2 TIMES DAILY
Qty: 60 CAPSULE | Refills: 2 | Status: SHIPPED | OUTPATIENT
Start: 2022-02-25 | End: 2022-04-22

## 2022-02-25 NOTE — PROGRESS NOTES
[facility]  Dilshad Lovett MD    [facility] [place] [address]  [phone]    Fax: [phone]    Patient Name: Earline Lemus  Medical Record Number: [id number]  PCP: Shira Harry NP  Age: 36 y.o. : 1985  Contact: [phone] (home)     History of Present Illness:     Earline Lemus is a 36 y.o.  female here for follow up of suspected rosacea. Current therapies include doxycycline and drug holiday off adderall ( 3 weeks off and then restarted). Doxycycline improved the rash but no improvement was seen with adderall drug holiday. Patient still has flares when she gets much sun exposure.     The patient has no other concerns today.    Review of Systems:     Unremarkable other than mentioned above.     Physical Exam:     General: Relaxed, oriented, alert    Skin examination of the scalp, face, neck, chest, back, abdomen, upper extremities and lower extremities were normal except for as listed below      Assessment and Plan:     1. Rosacea  - Erythematous papules and pustules on cheeks and nose with background erythema    Possible polymorphous light erruption component     Plan:    - continue Doxycycline 100 mg bid for flares  - hydrocortisone 2.5% cream before sun exposure  - start Heliocare prior to sun exposure     Counseling.  Rosacea can be a/w cardiac disease, and can involve the eyes as well.         Return to clinic in 2 months.    AVS printed with patient instructions     Dilshad Lovett MD   Mohs Surgery/Dermatologic Oncology  Dermatology

## 2022-03-25 ENCOUNTER — OFFICE VISIT (OUTPATIENT)
Dept: FAMILY MEDICINE | Facility: CLINIC | Age: 37
End: 2022-03-25
Payer: COMMERCIAL

## 2022-03-25 VITALS
OXYGEN SATURATION: 97 % | TEMPERATURE: 99 F | BODY MASS INDEX: 32.32 KG/M2 | HEIGHT: 65 IN | SYSTOLIC BLOOD PRESSURE: 118 MMHG | DIASTOLIC BLOOD PRESSURE: 80 MMHG | RESPIRATION RATE: 18 BRPM | WEIGHT: 194 LBS | HEART RATE: 78 BPM

## 2022-03-25 DIAGNOSIS — J30.9 ALLERGIC RHINITIS, UNSPECIFIED SEASONALITY, UNSPECIFIED TRIGGER: Primary | ICD-10-CM

## 2022-03-25 PROCEDURE — 4010F ACE/ARB THERAPY RXD/TAKEN: CPT | Mod: CPTII,,, | Performed by: NURSE PRACTITIONER

## 2022-03-25 PROCEDURE — 4010F PR ACE/ARB THEARPY RXD/TAKEN: ICD-10-PCS | Mod: CPTII,,, | Performed by: NURSE PRACTITIONER

## 2022-03-25 PROCEDURE — 3008F BODY MASS INDEX DOCD: CPT | Mod: CPTII,,, | Performed by: NURSE PRACTITIONER

## 2022-03-25 PROCEDURE — 3008F PR BODY MASS INDEX (BMI) DOCUMENTED: ICD-10-PCS | Mod: CPTII,,, | Performed by: NURSE PRACTITIONER

## 2022-03-25 PROCEDURE — 96372 THER/PROPH/DIAG INJ SC/IM: CPT | Mod: ,,, | Performed by: NURSE PRACTITIONER

## 2022-03-25 PROCEDURE — 1159F PR MEDICATION LIST DOCUMENTED IN MEDICAL RECORD: ICD-10-PCS | Mod: CPTII,,, | Performed by: NURSE PRACTITIONER

## 2022-03-25 PROCEDURE — 99214 OFFICE O/P EST MOD 30 MIN: CPT | Mod: 25,,, | Performed by: NURSE PRACTITIONER

## 2022-03-25 PROCEDURE — 99214 PR OFFICE/OUTPT VISIT, EST, LEVL IV, 30-39 MIN: ICD-10-PCS | Mod: 25,,, | Performed by: NURSE PRACTITIONER

## 2022-03-25 PROCEDURE — 3079F DIAST BP 80-89 MM HG: CPT | Mod: CPTII,,, | Performed by: NURSE PRACTITIONER

## 2022-03-25 PROCEDURE — 3074F PR MOST RECENT SYSTOLIC BLOOD PRESSURE < 130 MM HG: ICD-10-PCS | Mod: CPTII,,, | Performed by: NURSE PRACTITIONER

## 2022-03-25 PROCEDURE — 96372 PR INJECTION,THERAP/PROPH/DIAG2ST, IM OR SUBCUT: ICD-10-PCS | Mod: ,,, | Performed by: NURSE PRACTITIONER

## 2022-03-25 PROCEDURE — 1159F MED LIST DOCD IN RCRD: CPT | Mod: CPTII,,, | Performed by: NURSE PRACTITIONER

## 2022-03-25 PROCEDURE — 3079F PR MOST RECENT DIASTOLIC BLOOD PRESSURE 80-89 MM HG: ICD-10-PCS | Mod: CPTII,,, | Performed by: NURSE PRACTITIONER

## 2022-03-25 PROCEDURE — 3074F SYST BP LT 130 MM HG: CPT | Mod: CPTII,,, | Performed by: NURSE PRACTITIONER

## 2022-03-25 RX ORDER — LEVOCETIRIZINE DIHYDROCHLORIDE 5 MG/1
5 TABLET, FILM COATED ORAL NIGHTLY
Qty: 30 TABLET | Refills: 11 | Status: SHIPPED | OUTPATIENT
Start: 2022-03-25 | End: 2022-07-15

## 2022-03-25 RX ORDER — METHYLPREDNISOLONE 4 MG/1
TABLET ORAL
Qty: 21 EACH | Refills: 0 | Status: SHIPPED | OUTPATIENT
Start: 2022-03-25 | End: 2022-04-15

## 2022-03-25 RX ORDER — METHYLPREDNISOLONE ACETATE 40 MG/ML
40 INJECTION, SUSPENSION INTRA-ARTICULAR; INTRALESIONAL; INTRAMUSCULAR; SOFT TISSUE
Status: COMPLETED | OUTPATIENT
Start: 2022-03-25 | End: 2022-03-25

## 2022-03-25 RX ORDER — DEXAMETHASONE SODIUM PHOSPHATE 4 MG/ML
4 INJECTION, SOLUTION INTRA-ARTICULAR; INTRALESIONAL; INTRAMUSCULAR; INTRAVENOUS; SOFT TISSUE
Status: COMPLETED | OUTPATIENT
Start: 2022-03-25 | End: 2022-03-25

## 2022-03-25 RX ADMIN — METHYLPREDNISOLONE ACETATE 40 MG: 40 INJECTION, SUSPENSION INTRA-ARTICULAR; INTRALESIONAL; INTRAMUSCULAR; SOFT TISSUE at 03:03

## 2022-03-25 RX ADMIN — DEXAMETHASONE SODIUM PHOSPHATE 4 MG: 4 INJECTION, SOLUTION INTRA-ARTICULAR; INTRALESIONAL; INTRAMUSCULAR; INTRAVENOUS; SOFT TISSUE at 03:03

## 2022-03-25 NOTE — PROGRESS NOTES
Shira Harry NP   [facility]  [address] [address]  [place]     PATIENT NAME: Earline Lemus  : 1985  DATE: 3/25/22  MRN: [id number]      Billing Provider: Shira Harry NP  Level of Service:   Patient PCP Information     Provider PCP Type    Shira Harry NP General          Reason for Visit / Chief Complaint: Nasal Congestion (Patient states that she is having clear nasal drainage)       Update PCP  Update Chief Complaint         History of Present Illness / Problem Focused Workflow     Earline Lemus presents to the clinic, c/o clean nasal drainage and congestion      Review of Systems     Review of Systems   Constitutional: Negative for chills, fatigue and fever.   HENT: Positive for nasal congestion and postnasal drip. Negative for ear pain, facial swelling, hearing loss, mouth dryness, mouth sores, rhinorrhea, sinus pressure/congestion and goiter.    Eyes: Negative for discharge and itching.   Respiratory: Negative for cough, shortness of breath and wheezing.    Cardiovascular: Negative for chest pain and leg swelling.   Gastrointestinal: Negative for abdominal pain, change in bowel habit and change in bowel habit.   Genitourinary: Negative for difficulty urinating, dysuria, enuresis, frequency, hematuria and urgency.   Neurological: Negative for dizziness, vertigo, syncope, weakness and headaches.   Psychiatric/Behavioral: Negative for decreased concentration.        Medical / Social / Family History     Past Medical History:   Diagnosis Date    ADHD (attention deficit hyperactivity disorder)     Anxiety     COVID 2022    Depression     Hypertension     Maxillary sinusitis 2021    Pneumonia of right middle lobe due to infectious organism 2022       Past Surgical History:   Procedure Laterality Date    TUBAL LIGATION         Social History  Ms.  reports that she has been smoking cigarettes and vaping with nicotine. She has been smoking about 0.50 packs per day. She  has never used smokeless tobacco. She reports current alcohol use. She reports that she does not use drugs.    Family History  Ms.'s family history includes Heart disease in her father.    Medications and Allergies     Medications  Outpatient Medications Marked as Taking for the 3/25/22 encounter (Office Visit) with Shira Harry NP   Medication Sig Dispense Refill    dextroamphetamine-amphetamine (ADDERALL XR) 15 MG 24 hr capsule Take 1 capsule by mouth once daily.      diazePAM (VALIUM) 5 MG tablet TAKE 1/2 TABLET BY MOUTH TWICE DAILY FOR ANXIETY OR TAKE ONE TABLET AT BEDTIME FOR SLEEP      doxycycline (VIBRAMYCIN) 100 MG Cap Take 1 capsule (100 mg total) by mouth 2 (two) times daily. 60 capsule 2    EScitalopram oxalate (LEXAPRO) 20 MG tablet Take 1 tablet by mouth once daily.      furosemide (LASIX) 20 MG tablet Take 1 tablet (20 mg total) by mouth once daily. 30 tablet 0    hydrocortisone 2.5 % cream Apply topically 2 (two) times daily. 20 g 5    omeprazole (PRILOSEC) 20 MG capsule Take 1 capsule (20 mg total) by mouth once daily. 90 capsule 0    propranoloL (INDERAL LA) 60 MG 24 hr capsule Take 1 capsule (60 mg total) by mouth every evening. 90 capsule 1    telmisartan (MICARDIS) 40 MG Tab Take 1 tablet (40 mg total) by mouth once daily. 90 tablet 0     Current Facility-Administered Medications for the 3/25/22 encounter (Office Visit) with Shira Harry NP   Medication Dose Route Frequency Provider Last Rate Last Admin    [COMPLETED] dexamethasone injection 4 mg  4 mg Intramuscular 1 time in Clinic/HOD Shira Harry NP   4 mg at 03/25/22 1540    [COMPLETED] methylPREDNISolone acetate injection 40 mg  40 mg Intramuscular 1 time in Clinic/HOD Shira Harry NP   40 mg at 03/25/22 1541       Allergies  Review of patient's allergies indicates:  No Known Allergies    Physical Examination     Vitals:    03/25/22 1515   BP: 118/80   BP Location: Left arm   Patient Position: Sitting   BP Method:  "Medium (Manual)   Pulse: 78   Resp: 18   Temp: 98.7 °F (37.1 °C)   TempSrc: Oral   SpO2: 97%   Weight: 88 kg (194 lb)   Height: 5' 5" (1.651 m)      Physical Exam  Vitals and nursing note reviewed.   Constitutional:       Appearance: Normal appearance.   HENT:      Head: Normocephalic.      Right Ear: Tympanic membrane, ear canal and external ear normal.      Left Ear: Tympanic membrane, ear canal and external ear normal.      Nose: Rhinorrhea present.      Mouth/Throat:      Mouth: Mucous membranes are moist.      Pharynx: Oropharynx is clear.   Eyes:      Extraocular Movements: Extraocular movements intact.      Conjunctiva/sclera: Conjunctivae normal.      Pupils: Pupils are equal, round, and reactive to light.   Cardiovascular:      Rate and Rhythm: Normal rate and regular rhythm.      Pulses: Normal pulses.      Heart sounds: Normal heart sounds.   Pulmonary:      Effort: Pulmonary effort is normal.      Breath sounds: Normal breath sounds.   Abdominal:      General: Bowel sounds are normal.      Palpations: Abdomen is soft.   Musculoskeletal:         General: Normal range of motion.   Skin:     General: Skin is warm and dry.      Capillary Refill: Capillary refill takes less than 2 seconds.   Neurological:      General: No focal deficit present.      Mental Status: She is alert and oriented to person, place, and time.   Psychiatric:         Mood and Affect: Mood normal.         Behavior: Behavior normal.          Assessment and Plan (including Health Maintenance)      Problem List  Smart Sets  Document Outside HM   :    Plan: avoid smoking, meds as ordered, return to clinic as needed.     Allergic rhinitis, unspecified seasonality, unspecified trigger  -     dexamethasone injection 4 mg  -     methylPREDNISolone acetate injection 40 mg  -     levocetirizine (XYZAL) 5 MG tablet; Take 1 tablet (5 mg total) by mouth every evening.  Dispense: 30 tablet; Refill: 11  -     methylPREDNISolone (MEDROL DOSEPACK) 4 mg " tablet; use as directed  Dispense: 21 each; Refill: 0            Health Maintenance Due   Topic Date Due    Hepatitis C Screening  Never done    Cervical Cancer Screening  Never done    COVID-19 Vaccine (1) Never done    Pneumococcal Vaccines (Age 0-64) (1 of 2 - PPSV23) Never done    HIV Screening  Never done    TETANUS VACCINE  Never done    Influenza Vaccine (1) Never done       Problem List Items Addressed This Visit    None     Visit Diagnoses     Allergic rhinitis, unspecified seasonality, unspecified trigger    -  Primary    Relevant Medications    dexamethasone injection 4 mg (Completed)    methylPREDNISolone acetate injection 40 mg (Completed)    levocetirizine (XYZAL) 5 MG tablet    methylPREDNISolone (MEDROL DOSEPACK) 4 mg tablet            The patient has no Health Maintenance topics of status Not Due    Procedures     Future Appointments   Date Time Provider Department Center   4/29/2022  9:45 AM Leonor Lovett MD [facility] [place]   8/9/2022  3:30 PM Shira Harry NP [facility] [place]        Follow up if symptoms worsen or fail to improve, for and as scheduled.       Signature:  Shira Harry NP    Date of encounter: 3/25/22

## 2022-07-15 ENCOUNTER — OFFICE VISIT (OUTPATIENT)
Dept: FAMILY MEDICINE | Facility: CLINIC | Age: 37
End: 2022-07-15
Payer: COMMERCIAL

## 2022-07-15 VITALS
RESPIRATION RATE: 18 BRPM | HEIGHT: 65 IN | BODY MASS INDEX: 30.66 KG/M2 | OXYGEN SATURATION: 98 % | TEMPERATURE: 99 F | HEART RATE: 84 BPM | SYSTOLIC BLOOD PRESSURE: 132 MMHG | WEIGHT: 184 LBS | DIASTOLIC BLOOD PRESSURE: 88 MMHG

## 2022-07-15 DIAGNOSIS — K59.00 CONSTIPATION, UNSPECIFIED CONSTIPATION TYPE: ICD-10-CM

## 2022-07-15 DIAGNOSIS — R10.13 DYSPEPSIA: ICD-10-CM

## 2022-07-15 DIAGNOSIS — R10.10 PAIN OF UPPER ABDOMEN: Primary | ICD-10-CM

## 2022-07-15 PROCEDURE — 3008F PR BODY MASS INDEX (BMI) DOCUMENTED: ICD-10-PCS | Mod: CPTII,,, | Performed by: NURSE PRACTITIONER

## 2022-07-15 PROCEDURE — 1160F PR REVIEW ALL MEDS BY PRESCRIBER/CLIN PHARMACIST DOCUMENTED: ICD-10-PCS | Mod: CPTII,,, | Performed by: NURSE PRACTITIONER

## 2022-07-15 PROCEDURE — 4010F PR ACE/ARB THEARPY RXD/TAKEN: ICD-10-PCS | Mod: CPTII,,, | Performed by: NURSE PRACTITIONER

## 2022-07-15 PROCEDURE — 4010F ACE/ARB THERAPY RXD/TAKEN: CPT | Mod: CPTII,,, | Performed by: NURSE PRACTITIONER

## 2022-07-15 PROCEDURE — 99213 PR OFFICE/OUTPT VISIT, EST, LEVL III, 20-29 MIN: ICD-10-PCS | Mod: ,,, | Performed by: NURSE PRACTITIONER

## 2022-07-15 PROCEDURE — 3075F SYST BP GE 130 - 139MM HG: CPT | Mod: CPTII,,, | Performed by: NURSE PRACTITIONER

## 2022-07-15 PROCEDURE — 1159F PR MEDICATION LIST DOCUMENTED IN MEDICAL RECORD: ICD-10-PCS | Mod: CPTII,,, | Performed by: NURSE PRACTITIONER

## 2022-07-15 PROCEDURE — 3079F PR MOST RECENT DIASTOLIC BLOOD PRESSURE 80-89 MM HG: ICD-10-PCS | Mod: CPTII,,, | Performed by: NURSE PRACTITIONER

## 2022-07-15 PROCEDURE — 1159F MED LIST DOCD IN RCRD: CPT | Mod: CPTII,,, | Performed by: NURSE PRACTITIONER

## 2022-07-15 PROCEDURE — 3008F BODY MASS INDEX DOCD: CPT | Mod: CPTII,,, | Performed by: NURSE PRACTITIONER

## 2022-07-15 PROCEDURE — 99213 OFFICE O/P EST LOW 20 MIN: CPT | Mod: ,,, | Performed by: NURSE PRACTITIONER

## 2022-07-15 PROCEDURE — 3079F DIAST BP 80-89 MM HG: CPT | Mod: CPTII,,, | Performed by: NURSE PRACTITIONER

## 2022-07-15 PROCEDURE — 3075F PR MOST RECENT SYSTOLIC BLOOD PRESS GE 130-139MM HG: ICD-10-PCS | Mod: CPTII,,, | Performed by: NURSE PRACTITIONER

## 2022-07-15 PROCEDURE — 1160F RVW MEDS BY RX/DR IN RCRD: CPT | Mod: CPTII,,, | Performed by: NURSE PRACTITIONER

## 2022-07-15 RX ORDER — DEXTROAMPHETAMINE SACCHARATE, AMPHETAMINE ASPARTATE, DEXTROAMPHETAMINE SULFATE AND AMPHETAMINE SULFATE 2.5; 2.5; 2.5; 2.5 MG/1; MG/1; MG/1; MG/1
1 TABLET ORAL NIGHTLY
COMMUNITY
Start: 2022-06-24 | End: 2022-10-13 | Stop reason: SDUPTHER

## 2022-07-15 RX ORDER — DEXTROAMPHETAMINE SACCHARATE, AMPHETAMINE ASPARTATE MONOHYDRATE, DEXTROAMPHETAMINE SULFATE AND AMPHETAMINE SULFATE 5; 5; 5; 5 MG/1; MG/1; MG/1; MG/1
1 CAPSULE, EXTENDED RELEASE ORAL EVERY MORNING
COMMUNITY
Start: 2022-06-24 | End: 2022-07-29 | Stop reason: SDUPTHER

## 2022-07-15 RX ORDER — SUCRALFATE 1 G/1
1 TABLET ORAL 2 TIMES DAILY
Qty: 60 TABLET | Refills: 0 | Status: SHIPPED | OUTPATIENT
Start: 2022-07-15 | End: 2023-01-01 | Stop reason: SDUPTHER

## 2022-07-15 NOTE — PATIENT INSTRUCTIONS
Stool softner daily carafate, get h pylori avoid nsaids spicey acidic greasy foods do not eat 2 hours before bed

## 2022-07-15 NOTE — PROGRESS NOTES
2022     CARLOS Santos   [facility]  [address]  [place] [address]     PATIENT NAME: Earline Lemus  : 1985  DATE: 7/15/22  MRN: [id number]      Billing Provider: CARLOS Santos  Level of Service:   Patient PCP Information     Provider PCP Type    Shira Harry NP General          Reason for Visit / Chief Complaint: Abdominal Pain (Every time she eats for the past 3 days she vomits) and Menstrual Problem       Update PCP  Update Chief Complaint         History of Present Illness / Problem Focused Workflow     Earline Lemus presents to the clinic reflux symptoms also feels like food coming up, after eating only a small amount has upper abdominal pain, she has vomited a few time stomach contents no coffee ground emesis she has a bm about every other day. She does take nsaids frequently       Review of Systems     Review of Systems   Constitutional: Negative for appetite change, fatigue and unexpected weight change.   HENT: Negative for nosebleeds, sore throat, trouble swallowing and voice change.    Gastrointestinal: Positive for abdominal pain, constipation, nausea, vomiting and reflux. Negative for blood in stool.   Genitourinary: Positive for menstrual irregularity and menstrual problem. Negative for difficulty urinating.   Neurological: Negative for weakness and headaches.   Hematological: Does not bruise/bleed easily.        Medical / Social / Family History     Past Medical History:   Diagnosis Date    ADHD (attention deficit hyperactivity disorder)     Anxiety     COVID 2022    Depression     Hypertension     Maxillary sinusitis 2021    Pneumonia of right middle lobe due to infectious organism 2022       Past Surgical History:   Procedure Laterality Date    TUBAL LIGATION         Social History  Ms.  reports that she has been smoking cigarettes and vaping with nicotine. She has been smoking about 0.50 packs per day. She has  "never used smokeless tobacco. She reports current alcohol use. She reports that she does not use drugs.    Family History  Ms.'s family history includes Heart disease in her father.    Medications and Allergies     Medications  Outpatient Medications Marked as Taking for the 7/15/22 encounter (Office Visit) with CARLOS Santos   Medication Sig Dispense Refill    dextroamphetamine-amphetamine (ADDERALL XR) 20 MG 24 hr capsule Take 1 capsule by mouth every morning.      dextroamphetamine-amphetamine 10 mg Tab Take 1 tablet by mouth every evening.      diazePAM (VALIUM) 5 MG tablet TAKE 1/2 TABLET BY MOUTH TWICE DAILY FOR ANXIETY OR TAKE ONE TABLET AT BEDTIME FOR SLEEP      furosemide (LASIX) 20 MG tablet Take 1 tablet (20 mg total) by mouth once daily. 30 tablet 0    hydrocortisone 2.5 % cream Apply topically 2 (two) times daily. 20 g 5    hydrOXYzine pamoate (VISTARIL) 50 MG Cap Take 1-2 capsules by mouth every evening.      ipratropium (ATROVENT) 42 mcg (0.06 %) nasal spray 2 sprays by Nasal route 3 (three) times daily. 15 mL 1    omeprazole (PRILOSEC) 20 MG capsule Take 1 capsule (20 mg total) by mouth once daily. 90 capsule 0    telmisartan (MICARDIS) 40 MG Tab Take 1 tablet (40 mg total) by mouth once daily. 90 tablet 0       Allergies  Review of patient's allergies indicates:  No Known Allergies    Physical Examination     Vitals:    07/15/22 1611   BP: 132/88   BP Location: Right arm   Patient Position: Sitting   Pulse: 84   Resp: 18   Temp: 98.6 °F (37 °C)   TempSrc: Oral   SpO2: 98%   Weight: 83.5 kg (184 lb)   Height: 5' 5" (1.651 m)      Physical Exam  Vitals and nursing note reviewed.   Constitutional:       General: She is not in acute distress.     Appearance: Normal appearance. She is not ill-appearing, toxic-appearing or diaphoretic.   HENT:      Head: Normocephalic.      Right Ear: External ear normal.      Left Ear: External ear normal.      Nose: Nose normal.      " Mouth/Throat:      Mouth: Mucous membranes are moist.      Pharynx: Oropharynx is clear.   Eyes:      Extraocular Movements: Extraocular movements intact.      Conjunctiva/sclera: Conjunctivae normal.      Pupils: Pupils are equal, round, and reactive to light.   Cardiovascular:      Rate and Rhythm: Normal rate and regular rhythm.      Heart sounds: Normal heart sounds.   Pulmonary:      Effort: Pulmonary effort is normal.      Breath sounds: Normal breath sounds.   Abdominal:      General: Bowel sounds are normal. There is no distension.      Palpations: Abdomen is soft.      Tenderness: There is abdominal tenderness in the epigastric area. Negative signs include Bullock's sign and McBurney's sign.   Musculoskeletal:      Cervical back: Normal range of motion and neck supple.   Skin:     General: Skin is warm and dry.      Capillary Refill: Capillary refill takes less than 2 seconds.   Neurological:      General: No focal deficit present.      Mental Status: She is alert and oriented to person, place, and time.   Psychiatric:         Behavior: Behavior normal.     X-Ray KUB  Narrative: EXAMINATION:  XR KUB    CLINICAL HISTORY:  Upper abdominal pain, unspecified    COMPARISON:  None.    FINDINGS:  The stomach is slightly distended by mottled ingested material.  There is abundant stool throughout the colon.  No small bowel dilatation is seen.  There are oval-shaped calcific densities measuring a maximal 1.5 cm consistent with an ingested substance as well.    Surgical clips are present in the pelvis related to tubal ligation.    The patient is status post hemilaminectomy at L5 on the left.  Impression: 1. There is distention of the stomach with ingested material.  2. Abundant stool.  Place of service[facility]    Electronically signed by: Rosemary Dumont  Date:    07/15/2022  Time:    17:01       Assessment and Plan (including Health Maintenance)      Problem List  Smart Sets  Document Outside HM    :    Plan:     hpylori stool, start carafate avoid nsaids start miralax    Health Maintenance Due   Topic Date Due    Hepatitis C Screening  Never done    Cervical Cancer Screening  Never done    COVID-19 Vaccine (1) Never done    Pneumococcal Vaccines (Age 0-64) (1 - PCV) Never done    HIV Screening  Never done    TETANUS VACCINE  Never done       Problem List Items Addressed This Visit    None     Visit Diagnoses     Pain of upper abdomen    -  Primary    Relevant Medications    sucralfate (CARAFATE) 1 gram tablet    Other Relevant Orders    X-Ray KUB (Completed)    Dyspepsia        Relevant Medications    sucralfate (CARAFATE) 1 gram tablet    Other Relevant Orders    H. pylori antigen, stool    Constipation, unspecified constipation type            Pain of upper abdomen  -     X-Ray KUB; Future; Expected date: 07/15/2022  -     sucralfate (CARAFATE) 1 gram tablet; Take 1 tablet (1 g total) by mouth 2 (two) times daily.  Dispense: 60 tablet; Refill: 0    Dyspepsia  -     H. pylori antigen, stool; Future; Expected date: 07/15/2022  -     sucralfate (CARAFATE) 1 gram tablet; Take 1 tablet (1 g total) by mouth 2 (two) times daily.  Dispense: 60 tablet; Refill: 0    Constipation, unspecified constipation type       Health Maintenance Topics with due status: Not Due       Topic Last Completion Date    Influenza Vaccine Not Due       Procedures     Future Appointments   Date Time Provider Department Center   9/1/2022  2:30 PM Leonor Lovett MD [facility]        No follow-ups on file.     Signature:  CARLOS Santos    Date of encounter: 7/15/22

## 2022-07-29 ENCOUNTER — HOSPITAL ENCOUNTER (OUTPATIENT)
Dept: RADIOLOGY | Facility: HOSPITAL | Age: 37
Discharge: HOME | End: 2022-07-29
Attending: NURSE PRACTITIONER
Payer: COMMERCIAL

## 2022-07-29 ENCOUNTER — LAB VISIT (OUTPATIENT)
Dept: LAB | Facility: CLINIC | Age: 37
End: 2022-07-29
Payer: COMMERCIAL

## 2022-07-29 ENCOUNTER — OFFICE VISIT (OUTPATIENT)
Dept: FAMILY MEDICINE | Facility: CLINIC | Age: 37
End: 2022-07-29
Payer: COMMERCIAL

## 2022-07-29 ENCOUNTER — PATIENT MESSAGE (OUTPATIENT)
Dept: FAMILY MEDICINE | Facility: CLINIC | Age: 37
End: 2022-07-29
Payer: COMMERCIAL

## 2022-07-29 VITALS
DIASTOLIC BLOOD PRESSURE: 95 MMHG | SYSTOLIC BLOOD PRESSURE: 126 MMHG | BODY MASS INDEX: 28.96 KG/M2 | TEMPERATURE: 99 F | WEIGHT: 173.81 LBS | HEART RATE: 120 BPM | RESPIRATION RATE: 18 BRPM | OXYGEN SATURATION: 98 % | HEIGHT: 65 IN

## 2022-07-29 DIAGNOSIS — R10.9 ABDOMINAL PAIN, UNSPECIFIED ABDOMINAL LOCATION: ICD-10-CM

## 2022-07-29 DIAGNOSIS — R10.10 PAIN OF UPPER ABDOMEN: ICD-10-CM

## 2022-07-29 DIAGNOSIS — E86.0 DEHYDRATION: ICD-10-CM

## 2022-07-29 DIAGNOSIS — Z79.899 OTHER LONG TERM (CURRENT) DRUG THERAPY: ICD-10-CM

## 2022-07-29 DIAGNOSIS — E55.9 VITAMIN D DEFICIENCY, UNSPECIFIED: Primary | ICD-10-CM

## 2022-07-29 DIAGNOSIS — L65.9 HAIR LOSS: ICD-10-CM

## 2022-07-29 DIAGNOSIS — R11.2 INTRACTABLE VOMITING WITH NAUSEA, UNSPECIFIED VOMITING TYPE: ICD-10-CM

## 2022-07-29 DIAGNOSIS — R11.2 INTRACTABLE VOMITING WITH NAUSEA, UNSPECIFIED VOMITING TYPE: Primary | ICD-10-CM

## 2022-07-29 DIAGNOSIS — R10.13 DYSPEPSIA: ICD-10-CM

## 2022-07-29 LAB
25(OH)D3 SERPL-MCNC: 31.6 NG/ML
ALBUMIN SERPL BCP-MCNC: 3.8 G/DL (ref 3.5–5)
ALBUMIN/GLOB SERPL: 0.9 {RATIO}
ALP SERPL-CCNC: 89 U/L (ref 37–98)
ALT SERPL W P-5'-P-CCNC: 30 U/L (ref 13–56)
AMYLASE SERPL-CCNC: 28 U/L (ref 25–115)
ANION GAP SERPL CALCULATED.3IONS-SCNC: 10 MMOL/L (ref 7–16)
AST SERPL W P-5'-P-CCNC: 22 U/L (ref 15–37)
BASOPHILS # BLD AUTO: 0.02 K/UL (ref 0–0.2)
BASOPHILS NFR BLD AUTO: 0.2 % (ref 0–1)
BILIRUB SERPL-MCNC: 0.3 MG/DL (ref 0–1.2)
BUN SERPL-MCNC: 14 MG/DL (ref 7–18)
BUN/CREAT SERPL: 16 (ref 6–20)
CALCIUM SERPL-MCNC: 9.8 MG/DL (ref 8.5–10.1)
CHLORIDE SERPL-SCNC: 99 MMOL/L (ref 98–107)
CHOLEST SERPL-MCNC: 200 MG/DL (ref 0–200)
CHOLEST/HDLC SERPL: 5.3 {RATIO}
CO2 SERPL-SCNC: 29 MMOL/L (ref 21–32)
CREAT SERPL-MCNC: 0.9 MG/DL (ref 0.55–1.02)
DIFFERENTIAL METHOD BLD: ABNORMAL
EOSINOPHIL # BLD AUTO: 0.16 K/UL (ref 0–0.5)
EOSINOPHIL NFR BLD AUTO: 1.4 % (ref 1–4)
ERYTHROCYTE [DISTWIDTH] IN BLOOD BY AUTOMATED COUNT: 14.7 % (ref 11.5–14.5)
EST. AVERAGE GLUCOSE BLD GHB EST-MCNC: 90 MG/DL
GLOBULIN SER-MCNC: 4.3 G/DL (ref 2–4)
GLUCOSE SERPL-MCNC: 112 MG/DL (ref 74–106)
H. PYLORI STOOL: NEGATIVE
HBA1C MFR BLD HPLC: 5.3 % (ref 4.5–6.6)
HCT VFR BLD AUTO: 46.4 % (ref 38–47)
HDLC SERPL-MCNC: 38 MG/DL (ref 40–60)
HGB BLD-MCNC: 14.9 G/DL (ref 12–16)
LDLC SERPL CALC-MCNC: 138 MG/DL
LDLC/HDLC SERPL: 3.6 {RATIO}
LIPASE SERPL-CCNC: 117 U/L (ref 73–393)
LYMPHOCYTES # BLD AUTO: 2.17 K/UL (ref 1–4.8)
LYMPHOCYTES NFR BLD AUTO: 19.6 % (ref 27–41)
MCH RBC QN AUTO: 27.3 PG (ref 27–31)
MCHC RBC AUTO-ENTMCNC: 32.1 G/DL (ref 32–36)
MCV RBC AUTO: 85 FL (ref 80–96)
MONOCYTES # BLD AUTO: 1.04 K/UL (ref 0–0.8)
MONOCYTES NFR BLD AUTO: 9.4 % (ref 2–6)
MPC BLD CALC-MCNC: 9.9 FL (ref 9.4–12.4)
NEUTROPHILS # BLD AUTO: 7.66 K/UL (ref 1.8–7.7)
NEUTROPHILS NFR BLD AUTO: 69.4 % (ref 53–65)
NONHDLC SERPL-MCNC: 162 MG/DL
PLATELET # BLD AUTO: 409 K/UL (ref 150–400)
POTASSIUM SERPL-SCNC: 4.2 MMOL/L (ref 3.5–5.1)
PROT SERPL-MCNC: 8.1 G/DL (ref 6.4–8.2)
RBC # BLD AUTO: 5.46 M/UL (ref 4.2–5.4)
SODIUM SERPL-SCNC: 134 MMOL/L (ref 136–145)
TRIGL SERPL-MCNC: 122 MG/DL (ref 35–150)
TSH SERPL DL<=0.005 MIU/L-ACNC: 0.47 UIU/ML (ref 0.36–3.74)
VLDLC SERPL-MCNC: 24 MG/DL
WBC # BLD AUTO: 11.05 K/UL (ref 4.5–11)

## 2022-07-29 PROCEDURE — 3008F PR BODY MASS INDEX (BMI) DOCUMENTED: ICD-10-PCS | Mod: CPTII,,, | Performed by: NURSE PRACTITIONER

## 2022-07-29 PROCEDURE — 83690 LIPASE: ICD-10-PCS | Mod: ,,, | Performed by: CLINICAL MEDICAL LABORATORY

## 2022-07-29 PROCEDURE — 3080F DIAST BP >= 90 MM HG: CPT | Mod: CPTII,,, | Performed by: NURSE PRACTITIONER

## 2022-07-29 PROCEDURE — 3008F BODY MASS INDEX DOCD: CPT | Mod: CPTII,,, | Performed by: NURSE PRACTITIONER

## 2022-07-29 PROCEDURE — 1159F PR MEDICATION LIST DOCUMENTED IN MEDICAL RECORD: ICD-10-PCS | Mod: CPTII,,, | Performed by: NURSE PRACTITIONER

## 2022-07-29 PROCEDURE — 87338 HPYLORI STOOL AG IA: CPT | Mod: ,,, | Performed by: CLINICAL MEDICAL LABORATORY

## 2022-07-29 PROCEDURE — 96361 PR IV INFUSION, HYDRATION, EA ADD HOUR: ICD-10-PCS | Mod: ,,, | Performed by: NURSE PRACTITIONER

## 2022-07-29 PROCEDURE — 82306 VITAMIN D 25 HYDROXY: CPT | Mod: ,,, | Performed by: CLINICAL MEDICAL LABORATORY

## 2022-07-29 PROCEDURE — 4010F PR ACE/ARB THEARPY RXD/TAKEN: ICD-10-PCS | Mod: CPTII,,, | Performed by: NURSE PRACTITIONER

## 2022-07-29 PROCEDURE — 96361 HYDRATE IV INFUSION ADD-ON: CPT | Mod: ,,, | Performed by: NURSE PRACTITIONER

## 2022-07-29 PROCEDURE — 83690 ASSAY OF LIPASE: CPT | Mod: ,,, | Performed by: CLINICAL MEDICAL LABORATORY

## 2022-07-29 PROCEDURE — 96374 PR INJECTION,THERAP/PROPH/DIAGNOST, IV PUSH, INITIAL DRUG: ICD-10-PCS | Mod: ,,, | Performed by: NURSE PRACTITIONER

## 2022-07-29 PROCEDURE — 82306 VITAMIN D: ICD-10-PCS | Mod: ,,, | Performed by: CLINICAL MEDICAL LABORATORY

## 2022-07-29 PROCEDURE — 80061 LIPID PANEL: ICD-10-PCS | Mod: ,,, | Performed by: CLINICAL MEDICAL LABORATORY

## 2022-07-29 PROCEDURE — 1160F RVW MEDS BY RX/DR IN RCRD: CPT | Mod: CPTII,,, | Performed by: NURSE PRACTITIONER

## 2022-07-29 PROCEDURE — 1159F MED LIST DOCD IN RCRD: CPT | Mod: CPTII,,, | Performed by: NURSE PRACTITIONER

## 2022-07-29 PROCEDURE — 4010F ACE/ARB THERAPY RXD/TAKEN: CPT | Mod: CPTII,,, | Performed by: NURSE PRACTITIONER

## 2022-07-29 PROCEDURE — 99213 OFFICE O/P EST LOW 20 MIN: CPT | Mod: 25,,, | Performed by: NURSE PRACTITIONER

## 2022-07-29 PROCEDURE — 1160F PR REVIEW ALL MEDS BY PRESCRIBER/CLIN PHARMACIST DOCUMENTED: ICD-10-PCS | Mod: CPTII,,, | Performed by: NURSE PRACTITIONER

## 2022-07-29 PROCEDURE — 3080F PR MOST RECENT DIASTOLIC BLOOD PRESSURE >= 90 MM HG: ICD-10-PCS | Mod: CPTII,,, | Performed by: NURSE PRACTITIONER

## 2022-07-29 PROCEDURE — 96374 THER/PROPH/DIAG INJ IV PUSH: CPT | Mod: ,,, | Performed by: NURSE PRACTITIONER

## 2022-07-29 PROCEDURE — 83036 HEMOGLOBIN GLYCOSYLATED A1C: CPT | Mod: ,,, | Performed by: CLINICAL MEDICAL LABORATORY

## 2022-07-29 PROCEDURE — 82150 AMYLASE: ICD-10-PCS | Mod: ,,, | Performed by: CLINICAL MEDICAL LABORATORY

## 2022-07-29 PROCEDURE — 3074F PR MOST RECENT SYSTOLIC BLOOD PRESSURE < 130 MM HG: ICD-10-PCS | Mod: CPTII,,, | Performed by: NURSE PRACTITIONER

## 2022-07-29 PROCEDURE — 3074F SYST BP LT 130 MM HG: CPT | Mod: CPTII,,, | Performed by: NURSE PRACTITIONER

## 2022-07-29 PROCEDURE — 80061 LIPID PANEL: CPT | Mod: ,,, | Performed by: CLINICAL MEDICAL LABORATORY

## 2022-07-29 PROCEDURE — 82150 ASSAY OF AMYLASE: CPT | Mod: ,,, | Performed by: CLINICAL MEDICAL LABORATORY

## 2022-07-29 PROCEDURE — 87338 H. PYLORI ANTIGEN, STOOL: ICD-10-PCS | Mod: ,,, | Performed by: CLINICAL MEDICAL LABORATORY

## 2022-07-29 PROCEDURE — 84443 ASSAY THYROID STIM HORMONE: CPT | Mod: ,,, | Performed by: CLINICAL MEDICAL LABORATORY

## 2022-07-29 PROCEDURE — 76700 US EXAM ABDOM COMPLETE: CPT | Mod: TC

## 2022-07-29 PROCEDURE — 99213 PR OFFICE/OUTPT VISIT, EST, LEVL III, 20-29 MIN: ICD-10-PCS | Mod: 25,,, | Performed by: NURSE PRACTITIONER

## 2022-07-29 PROCEDURE — 84443 TSH: ICD-10-PCS | Mod: ,,, | Performed by: CLINICAL MEDICAL LABORATORY

## 2022-07-29 PROCEDURE — 83036 HEMOGLOBIN A1C: ICD-10-PCS | Mod: ,,, | Performed by: CLINICAL MEDICAL LABORATORY

## 2022-07-29 RX ORDER — SODIUM CHLORIDE 9 MG/ML
INJECTION, SOLUTION INTRAVENOUS ONCE
Status: COMPLETED | OUTPATIENT
Start: 2022-07-29 | End: 2022-07-29

## 2022-07-29 RX ORDER — POTASSIUM CHLORIDE 20 MEQ/1
20 TABLET, EXTENDED RELEASE ORAL DAILY
COMMUNITY
Start: 2022-07-25 | End: 2023-01-01

## 2022-07-29 RX ORDER — ONDANSETRON 2 MG/ML
8 INJECTION INTRAMUSCULAR; INTRAVENOUS
Status: COMPLETED | OUTPATIENT
Start: 2022-07-29 | End: 2022-07-29

## 2022-07-29 RX ORDER — ONDANSETRON 4 MG/1
4-8 TABLET, ORALLY DISINTEGRATING ORAL EVERY 8 HOURS PRN
COMMUNITY
Start: 2022-07-25 | End: 2022-07-29 | Stop reason: SDUPTHER

## 2022-07-29 RX ORDER — DEXTROAMPHETAMINE SACCHARATE, AMPHETAMINE ASPARTATE MONOHYDRATE, DEXTROAMPHETAMINE SULFATE AND AMPHETAMINE SULFATE 6.25; 6.25; 6.25; 6.25 MG/1; MG/1; MG/1; MG/1
1 CAPSULE, EXTENDED RELEASE ORAL EVERY MORNING
COMMUNITY
Start: 2022-07-26 | End: 2022-09-15 | Stop reason: SDUPTHER

## 2022-07-29 RX ORDER — ONDANSETRON 4 MG/1
4-8 TABLET, ORALLY DISINTEGRATING ORAL EVERY 8 HOURS PRN
Qty: 30 TABLET | Refills: 0 | Status: SHIPPED | OUTPATIENT
Start: 2022-07-29 | End: 2022-09-15

## 2022-07-29 RX ORDER — PROPRANOLOL HYDROCHLORIDE 60 MG/1
60 CAPSULE, EXTENDED RELEASE ORAL DAILY
COMMUNITY
Start: 2022-07-26 | End: 2022-09-15

## 2022-07-29 RX ORDER — ESCITALOPRAM OXALATE 20 MG
20 TABLET ORAL NIGHTLY
COMMUNITY
Start: 2022-07-26 | End: 2022-09-15

## 2022-07-29 RX ADMIN — ONDANSETRON 8 MG: 2 INJECTION INTRAMUSCULAR; INTRAVENOUS at 11:07

## 2022-07-29 RX ADMIN — SODIUM CHLORIDE: 9 INJECTION, SOLUTION INTRAVENOUS at 11:07

## 2022-07-29 NOTE — PROGRESS NOTES
20G IV started to right AC x1 attempt by Romy Donnelly RN, good blood return, patient tolerated well, taped and secured.

## 2022-07-29 NOTE — PROGRESS NOTES
2022     CARLOS Santos   [facility]  [address]  [place] [address]     PATIENT NAME: Earline Lemus  : 1985  DATE: 22  MRN: [id number]      Billing Provider: CARLOS Santos  Level of Service:   Patient PCP Information     Provider PCP Type    Shira Harry NP General          Reason for Visit / Chief Complaint: Dizziness (Started yesterday), Fatigue, Altered Mental Status, Chest Pain, Emesis (At night), and Nausea (Throughout the day)       Update PCP  Update Chief Complaint         History of Present Illness / Problem Focused Workflow     Earlien Lemus presents to the clinic has been vomiting since last office visit , she was so unwell Monday went to ER in [place] her potassium low and dehydrated, she is having palpitations. She has been taking oral potassium reports took an enema last night to help have a bm to get her h pylori sample collected. After infusion she is feeling better- sending to [place] to get abdominal ultrasound also refer urgent for GI        Wt Readings from Last 3 Encounters:   22 1002 78.8 kg (173 lb 12.8 oz)   07/15/22 1611 83.5 kg (184 lb)   22 1515 88 kg (194 lb)     CMP  Sodium   Date Value Ref Range Status   2022 134 (L) 136 - 145 mmol/L Final     Potassium   Date Value Ref Range Status   2022 4.2 3.5 - 5.1 mmol/L Final     Chloride   Date Value Ref Range Status   2022 99 98 - 107 mmol/L Final     CO2   Date Value Ref Range Status   2022 29 21 - 32 mmol/L Final     Glucose   Date Value Ref Range Status   2022 112 (H) 74 - 106 mg/dL Final     BUN   Date Value Ref Range Status   2022 14 7 - 18 mg/dL Final     Creatinine   Date Value Ref Range Status   2022 0.90 0.55 - 1.02 mg/dL Final     Calcium   Date Value Ref Range Status   2022 9.8 8.5 - 10.1 mg/dL Final     Total Protein   Date Value Ref Range Status   2022 8.1 6.4 - 8.2 g/dL Final     Albumin   Date  Value Ref Range Status   07/29/2022 3.8 3.5 - 5.0 g/dL Final     Bilirubin, Total   Date Value Ref Range Status   07/29/2022 0.3 0.0 - 1.2 mg/dL Final     Alk Phos   Date Value Ref Range Status   07/29/2022 89 37 - 98 U/L Final     AST   Date Value Ref Range Status   07/29/2022 22 15 - 37 U/L Final     ALT   Date Value Ref Range Status   07/29/2022 30 13 - 56 U/L Final     Anion Gap   Date Value Ref Range Status   07/29/2022 10 7 - 16 mmol/L Final     eGFR   Date Value Ref Range Status   07/29/2022 74 >=60 mL/min/1.73m² Final     Lab Results   Component Value Date    WBC 11.05 (H) 07/29/2022    RBC 5.46 (H) 07/29/2022    HGB 14.9 07/29/2022    HCT 46.4 07/29/2022    MCV 85.0 07/29/2022    MCH 27.3 07/29/2022    MCHC 32.1 07/29/2022    RDW 14.7 (H) 07/29/2022     (H) 07/29/2022    MPV 9.9 07/29/2022    LYMPH 19.6 (L) 07/29/2022    LYMPH 2.17 07/29/2022    MONO 9.4 (H) 07/29/2022    EOS 0.16 07/29/2022    BASO 0.02 07/29/2022    EOSINOPHIL 1.4 07/29/2022    BASOPHIL 0.2 07/29/2022         Review of Systems     Review of Systems   Constitutional: Positive for appetite change, chills and fatigue. Negative for fever.   Gastrointestinal: Positive for change in bowel habit, nausea, vomiting and change in bowel habit.   Genitourinary: Positive for decreased urine volume.        Medical / Social / Family History     Past Medical History:   Diagnosis Date    ADHD (attention deficit hyperactivity disorder)     Anxiety     COVID 1/17/2022    Depression     Hypertension     Maxillary sinusitis 8/11/2021    Pneumonia of right middle lobe due to infectious organism 1/27/2022       Past Surgical History:   Procedure Laterality Date    TUBAL LIGATION         Social History  Ms.  reports that she has been smoking cigarettes and vaping with nicotine. She has been smoking about 0.50 packs per day. She has never used smokeless tobacco. She reports current alcohol use. She reports that she does not use drugs.    Family  History  Ms.'s family history includes Heart disease in her father.    Medications and Allergies     Medications  Outpatient Medications Marked as Taking for the 7/29/22 encounter (Office Visit) with CARLOS Santos   Medication Sig Dispense Refill    dextroamphetamine-amphetamine (ADDERALL XR) 25 MG 24 hr capsule Take 1 tablet by mouth every morning.      dextroamphetamine-amphetamine 10 mg Tab Take 1 tablet by mouth every evening.      diazePAM (VALIUM) 5 MG tablet TAKE 1/2 TABLET BY MOUTH TWICE DAILY FOR ANXIETY OR TAKE ONE TABLET AT BEDTIME FOR SLEEP      LEXAPRO 20 mg tablet Take 20 mg by mouth nightly.      omeprazole (PRILOSEC) 20 MG capsule Take 1 capsule (20 mg total) by mouth once daily. 90 capsule 0    ondansetron (ZOFRAN-ODT) 4 MG TbDL Take 1-2 tablets (4-8 mg total) by mouth every 8 (eight) hours as needed (nausea). 30 tablet 0    potassium chloride SA (K-DUR,KLOR-CON) 20 MEQ tablet Take 20 mEq by mouth once daily.      propranoloL (INDERAL LA) 60 MG 24 hr capsule Take 60 mg by mouth once daily.      sucralfate (CARAFATE) 1 gram tablet Take 1 tablet (1 g total) by mouth 2 (two) times daily. 60 tablet 0    telmisartan (MICARDIS) 40 MG Tab Take 1 tablet (40 mg total) by mouth once daily. 90 tablet 0    [DISCONTINUED] ondansetron (ZOFRAN-ODT) 4 MG TbDL Take 4-8 mg by mouth every 8 (eight) hours as needed.       Current Facility-Administered Medications for the 7/29/22 encounter (Office Visit) with CARLOS Santos   Medication Dose Route Frequency Provider Last Rate Last Admin    [COMPLETED] 0.9%  NaCl infusion   Intravenous Once CARLOS Santos   Stopped at 07/29/22 1203    [COMPLETED] ondansetron injection 8 mg  8 mg Intravenous 1 time in Clinic/HOD CARLOS Santos   8 mg at 07/29/22 1111       Allergies  Review of patient's allergies indicates:  No Known Allergies    Physical Examination     Vitals:    07/29/22 1002   BP: (!) 126/95  "  BP Location: Left arm   Patient Position: Sitting   BP Method: Small (Automatic)   Pulse: (!) 120   Resp: 18   Temp: 98.6 °F (37 °C)   TempSrc: Oral   SpO2: 98%   Weight: 78.8 kg (173 lb 12.8 oz)   Height: 5' 5" (1.651 m)      Physical Exam  Vitals and nursing note reviewed.   Constitutional:       General: She is not in acute distress.     Appearance: She is not toxic-appearing.   HENT:      Right Ear: External ear normal.      Left Ear: External ear normal.      Mouth/Throat:      Mouth: Mucous membranes are dry.   Eyes:      Conjunctiva/sclera: Conjunctivae normal.      Pupils: Pupils are equal, round, and reactive to light.   Cardiovascular:      Rate and Rhythm: Tachycardia present.      Heart sounds: Normal heart sounds.   Pulmonary:      Effort: Pulmonary effort is normal.      Breath sounds: Normal breath sounds.   Abdominal:      General: There is distension.      Comments: Hyperactive bowel sounds   Musculoskeletal:      Cervical back: Normal range of motion and neck supple.   Skin:     Capillary Refill: Capillary refill takes less than 2 seconds.      Comments: Flushed    Neurological:      General: No focal deficit present.      Mental Status: She is alert.   Psychiatric:         Behavior: Behavior normal.          Assessment and Plan (including Health Maintenance)      Problem List  Smart Sets  Document Outside HM   :    Plan:   zofran every 8 hours back to ER over weekend if not able to hold fluids abdominal ultrasound today      Health Maintenance Due   Topic Date Due    Hepatitis C Screening  Never done    Cervical Cancer Screening  Never done    COVID-19 Vaccine (1) Never done    Pneumococcal Vaccines (Age 0-64) (1 - PCV) Never done    HIV Screening  Never done    TETANUS VACCINE  Never done       Problem List Items Addressed This Visit        Renal/    Dehydration    Relevant Medications    0.9%  NaCl infusion (Completed)    ondansetron injection 8 mg (Completed)      Other Visit " Diagnoses     Intractable vomiting with nausea, unspecified vomiting type    -  Primary    Relevant Medications    ondansetron injection 8 mg (Completed)    ondansetron (ZOFRAN-ODT) 4 MG TbDL    Other Relevant Orders    Ambulatory referral/consult to Gastroenterology    US Abdomen Complete    Pain of upper abdomen        Relevant Orders    Ambulatory referral/consult to Gastroenterology    US Abdomen Complete    Dyspepsia        Relevant Orders    Ambulatory referral/consult to Gastroenterology        Intractable vomiting with nausea, unspecified vomiting type  -     ondansetron injection 8 mg  -     Ambulatory referral/consult to Gastroenterology; Future; Expected date: 08/05/2022  -     US Abdomen Complete; Future; Expected date: 07/29/2022  -     ondansetron (ZOFRAN-ODT) 4 MG TbDL; Take 1-2 tablets (4-8 mg total) by mouth every 8 (eight) hours as needed (nausea).  Dispense: 30 tablet; Refill: 0    Dehydration  -     0.9%  NaCl infusion  -     ondansetron injection 8 mg    Pain of upper abdomen  -     Ambulatory referral/consult to Gastroenterology; Future; Expected date: 08/05/2022  -     US Abdomen Complete; Future; Expected date: 07/29/2022    Dyspepsia  -     Ambulatory referral/consult to Gastroenterology; Future; Expected date: 08/05/2022    Other orders  -     Cancel: sodium chloride 0.9% bolus 1,000 mL  -     Cancel: sodium chloride 0.9% bolus 1,000 mL       Health Maintenance Topics with due status: Not Due       Topic Last Completion Date    Influenza Vaccine Not Due       Procedures     No future appointments.     Follow up if symptoms worsen or fail to improve.     Signature:  CARLOS Santos    Date of encounter: 7/29/22

## 2022-08-26 ENCOUNTER — OFFICE VISIT (OUTPATIENT)
Dept: FAMILY MEDICINE | Facility: CLINIC | Age: 37
End: 2022-08-26
Payer: COMMERCIAL

## 2022-08-26 VITALS
HEART RATE: 84 BPM | TEMPERATURE: 98 F | WEIGHT: 173 LBS | SYSTOLIC BLOOD PRESSURE: 120 MMHG | BODY MASS INDEX: 28.82 KG/M2 | HEIGHT: 65 IN | DIASTOLIC BLOOD PRESSURE: 80 MMHG | OXYGEN SATURATION: 98 % | RESPIRATION RATE: 18 BRPM

## 2022-08-26 DIAGNOSIS — S92.355A CLOSED NONDISPLACED FRACTURE OF FIFTH METATARSAL BONE OF LEFT FOOT, INITIAL ENCOUNTER: Primary | ICD-10-CM

## 2022-08-26 DIAGNOSIS — M79.672 LEFT FOOT PAIN: ICD-10-CM

## 2022-08-26 PROCEDURE — 4010F ACE/ARB THERAPY RXD/TAKEN: CPT | Mod: ,,, | Performed by: NURSE PRACTITIONER

## 2022-08-26 PROCEDURE — 3008F BODY MASS INDEX DOCD: CPT | Mod: ,,, | Performed by: NURSE PRACTITIONER

## 2022-08-26 PROCEDURE — 3074F PR MOST RECENT SYSTOLIC BLOOD PRESSURE < 130 MM HG: ICD-10-PCS | Mod: ,,, | Performed by: NURSE PRACTITIONER

## 2022-08-26 PROCEDURE — 1159F MED LIST DOCD IN RCRD: CPT | Mod: ,,, | Performed by: NURSE PRACTITIONER

## 2022-08-26 PROCEDURE — 3044F PR MOST RECENT HEMOGLOBIN A1C LEVEL <7.0%: ICD-10-PCS | Mod: ,,, | Performed by: NURSE PRACTITIONER

## 2022-08-26 PROCEDURE — 3044F HG A1C LEVEL LT 7.0%: CPT | Mod: ,,, | Performed by: NURSE PRACTITIONER

## 2022-08-26 PROCEDURE — 4010F PR ACE/ARB THEARPY RXD/TAKEN: ICD-10-PCS | Mod: ,,, | Performed by: NURSE PRACTITIONER

## 2022-08-26 PROCEDURE — 3079F PR MOST RECENT DIASTOLIC BLOOD PRESSURE 80-89 MM HG: ICD-10-PCS | Mod: ,,, | Performed by: NURSE PRACTITIONER

## 2022-08-26 PROCEDURE — 3079F DIAST BP 80-89 MM HG: CPT | Mod: ,,, | Performed by: NURSE PRACTITIONER

## 2022-08-26 PROCEDURE — 99214 OFFICE O/P EST MOD 30 MIN: CPT | Mod: ,,, | Performed by: NURSE PRACTITIONER

## 2022-08-26 PROCEDURE — 99214 PR OFFICE/OUTPT VISIT, EST, LEVL IV, 30-39 MIN: ICD-10-PCS | Mod: ,,, | Performed by: NURSE PRACTITIONER

## 2022-08-26 PROCEDURE — 3008F PR BODY MASS INDEX (BMI) DOCUMENTED: ICD-10-PCS | Mod: ,,, | Performed by: NURSE PRACTITIONER

## 2022-08-26 PROCEDURE — 1159F PR MEDICATION LIST DOCUMENTED IN MEDICAL RECORD: ICD-10-PCS | Mod: ,,, | Performed by: NURSE PRACTITIONER

## 2022-08-26 PROCEDURE — 3074F SYST BP LT 130 MM HG: CPT | Mod: ,,, | Performed by: NURSE PRACTITIONER

## 2022-08-26 RX ORDER — OMEPRAZOLE 40 MG/1
40 CAPSULE, DELAYED RELEASE ORAL 2 TIMES DAILY
COMMUNITY
Start: 2022-08-17 | End: 2023-01-01 | Stop reason: SDUPTHER

## 2022-08-26 RX ORDER — HYDROXYZINE PAMOATE 50 MG/1
50-100 CAPSULE ORAL NIGHTLY
COMMUNITY
Start: 2022-08-17 | End: 2022-09-15

## 2022-08-26 NOTE — PROGRESS NOTES
Shira Harry NP   [facility]  [address]  [place]     PATIENT NAME: Earline Lemus  : 1985  DATE: 22  MRN: [id number]      Billing Provider: Shira Harry NP  Level of Service:   Patient PCP Information     Provider PCP Type    Shira Harry NP General          Reason for Visit / Chief Complaint: Foot Injury (Left foot pain, twisted foot and having some pain)       Update PCP  Update Chief Complaint         History of Present Illness / Problem Focused Workflow     Earline Lemus presents to the clinic c/o left foot pain, twisted foot and has been having some pain, incident occurred last night,rates pain 6, sted that she bumped hospital bed and twisted       Review of Systems     Review of Systems   Constitutional: Negative for chills, fatigue and fever.   HENT: Negative for nasal congestion, ear pain, facial swelling, hearing loss, mouth dryness, mouth sores, postnasal drip, rhinorrhea, sinus pressure/congestion and goiter.    Eyes: Negative for discharge and itching.   Respiratory: Negative for cough, shortness of breath and wheezing.    Cardiovascular: Negative for chest pain and leg swelling.   Gastrointestinal: Negative for abdominal pain, change in bowel habit and change in bowel habit.   Genitourinary: Negative for difficulty urinating, dysuria, enuresis, frequency, hematuria and urgency.   Musculoskeletal:        Pain of left foot   Neurological: Negative for dizziness, vertigo, syncope, weakness and headaches.   Psychiatric/Behavioral: Negative for decreased concentration.        Medical / Social / Family History     Past Medical History:   Diagnosis Date    ADHD (attention deficit hyperactivity disorder)     Anxiety     COVID 2022    Depression     Hypertension     Maxillary sinusitis 2021    Pneumonia of right middle lobe due to infectious organism 2022       Past Surgical History:   Procedure Laterality Date    TUBAL LIGATION         Social  "History  Ms.  reports that she has been smoking cigarettes and vaping with nicotine. She has been smoking about 0.50 packs per day. She has never used smokeless tobacco. She reports current alcohol use. She reports that she does not use drugs.    Family History  Ms.'s family history includes Heart disease in her father.    Medications and Allergies     Medications  Outpatient Medications Marked as Taking for the 8/26/22 encounter (Office Visit) with Shira Harry NP   Medication Sig Dispense Refill    dextroamphetamine-amphetamine (ADDERALL XR) 25 MG 24 hr capsule Take 1 tablet by mouth every morning.      dextroamphetamine-amphetamine 10 mg Tab Take 1 tablet by mouth every evening.      diazePAM (VALIUM) 5 MG tablet TAKE 1/2 TABLET BY MOUTH TWICE DAILY FOR ANXIETY OR TAKE ONE TABLET AT BEDTIME FOR SLEEP      hydrOXYzine pamoate (VISTARIL) 50 MG Cap Take  mg by mouth every evening.      LEXAPRO 20 mg tablet Take 20 mg by mouth nightly.      omeprazole (PRILOSEC) 40 MG capsule Take 40 mg by mouth 2 (two) times daily.      ondansetron (ZOFRAN-ODT) 4 MG TbDL Take 1-2 tablets (4-8 mg total) by mouth every 8 (eight) hours as needed (nausea). 30 tablet 0    potassium chloride SA (K-DUR,KLOR-CON) 20 MEQ tablet Take 20 mEq by mouth once daily.      propranoloL (INDERAL LA) 60 MG 24 hr capsule Take 60 mg by mouth once daily.      sucralfate (CARAFATE) 1 gram tablet Take 1 tablet (1 g total) by mouth 2 (two) times daily. 60 tablet 0    telmisartan (MICARDIS) 40 MG Tab Take 1 tablet (40 mg total) by mouth once daily. 90 tablet 0       Allergies  Review of patient's allergies indicates:  No Known Allergies    Physical Examination     Vitals:    08/26/22 0841   BP: 120/80   BP Location: Left arm   Patient Position: Sitting   Pulse: 84   Resp: 18   Temp: 98.4 °F (36.9 °C)   TempSrc: Oral   SpO2: 98%   Weight: 78.5 kg (173 lb)   Height: 5' 5" (1.651 m)      Physical Exam  Vitals and nursing note reviewed. "   Constitutional:       Appearance: Normal appearance.   HENT:      Head: Normocephalic.      Right Ear: Tympanic membrane, ear canal and external ear normal.      Left Ear: Tympanic membrane, ear canal and external ear normal.      Nose: Nose normal.      Mouth/Throat:      Mouth: Mucous membranes are moist.      Pharynx: Oropharynx is clear.   Eyes:      Extraocular Movements: Extraocular movements intact.      Conjunctiva/sclera: Conjunctivae normal.      Pupils: Pupils are equal, round, and reactive to light.   Cardiovascular:      Rate and Rhythm: Normal rate and regular rhythm.      Pulses: Normal pulses.      Heart sounds: Normal heart sounds.   Pulmonary:      Effort: Pulmonary effort is normal.      Breath sounds: Normal breath sounds.   Abdominal:      General: Bowel sounds are normal.      Palpations: Abdomen is soft.   Musculoskeletal:         General: Tenderness (mild swelling of left 5th metatarsal, tender to bear wt or palpate, pt was placed in short boot splint provided by the medical store) present. Normal range of motion.   Skin:     General: Skin is warm and dry.      Capillary Refill: Capillary refill takes less than 2 seconds.   Neurological:      General: No focal deficit present.      Mental Status: She is alert and oriented to person, place, and time.   Psychiatric:         Mood and Affect: Mood normal.         Behavior: Behavior normal.          Assessment and Plan (including Health Maintenance)      Problem List  Smart Sets  Document Outside HM   :    Plan: rice, refer to orthopedics    Left foot pain  -     Cancel: X-Ray Foot 2 View Left; Future; Expected date: 08/26/2022    Closed nondisplaced fracture of fifth metatarsal bone of left foot, initial encounter  -     Ambulatory referral/consult to Orthopedics; Future; Expected date: 08/26/2022            Health Maintenance Due   Topic Date Due    Hepatitis C Screening  Never done    Cervical Cancer Screening  Never done    COVID-19  Vaccine (1) Never done    Pneumococcal Vaccines (Age 0-64) (1 - PCV) Never done    HIV Screening  Never done    TETANUS VACCINE  Never done       Problem List Items Addressed This Visit    None     Visit Diagnoses     Left foot pain    -  Primary    Closed nondisplaced fracture of fifth metatarsal bone of left foot, initial encounter        Relevant Orders    Ambulatory referral/consult to Orthopedics            Health Maintenance Topics with due status: Not Due       Topic Last Completion Date    Influenza Vaccine Not Due       Procedures     No future appointments.     Follow up if symptoms worsen or fail to improve.       Signature:  Shira Harry NP    Date of encounter: 8/26/22

## 2022-09-12 ENCOUNTER — HOSPITAL ENCOUNTER (OUTPATIENT)
Dept: RADIOLOGY | Facility: HOSPITAL | Age: 37
Discharge: HOME | End: 2022-09-12
Attending: ORTHOPAEDIC SURGERY
Payer: COMMERCIAL

## 2022-09-12 DIAGNOSIS — S92.355A CLOSED NONDISPLACED FRACTURE OF FIFTH METATARSAL BONE OF LEFT FOOT, INITIAL ENCOUNTER: ICD-10-CM

## 2022-09-12 PROCEDURE — 73630 X-RAY EXAM OF FOOT: CPT | Mod: TC,LT

## 2022-09-15 ENCOUNTER — OFFICE VISIT (OUTPATIENT)
Dept: FAMILY MEDICINE | Facility: CLINIC | Age: 37
End: 2022-09-15
Payer: COMMERCIAL

## 2022-09-15 VITALS
OXYGEN SATURATION: 98 % | DIASTOLIC BLOOD PRESSURE: 94 MMHG | WEIGHT: 175 LBS | HEIGHT: 65 IN | HEART RATE: 79 BPM | BODY MASS INDEX: 29.16 KG/M2 | SYSTOLIC BLOOD PRESSURE: 143 MMHG | RESPIRATION RATE: 20 BRPM

## 2022-09-15 DIAGNOSIS — Z23 NEED FOR VACCINATION: ICD-10-CM

## 2022-09-15 DIAGNOSIS — Z79.899 HISTORY OF LONG-TERM TREATMENT WITH HIGH-RISK MEDICATION: ICD-10-CM

## 2022-09-15 DIAGNOSIS — F98.8 ATTENTION DEFICIT DISORDER (ADD) WITHOUT HYPERACTIVITY: Primary | ICD-10-CM

## 2022-09-15 DIAGNOSIS — I10 PRIMARY HYPERTENSION: ICD-10-CM

## 2022-09-15 LAB
B-HCG UR QL: NEGATIVE
CTP QC/QA: YES
CTP QC/QA: YES
POC (AMP) AMPHETAMINE: ABNORMAL
POC (BAR) BARBITURATES: NEGATIVE
POC (BUP) BUPRENORPHINE: NEGATIVE
POC (BZO) BENZODIAZEPINES: NEGATIVE
POC (COC) COCAINE: NEGATIVE
POC (MDMA) METHYLENEDIOXYMETHAMPHETAMINE 3,4: NEGATIVE
POC (MET) METHAMPHETAMINE: NEGATIVE
POC (MOP) OPIATES: ABNORMAL
POC (MTD) METHADONE: NEGATIVE
POC (OXY) OXYCODONE: NEGATIVE
POC (PCP) PHENCYCLIDINE: NEGATIVE
POC (TCA) TRICYCLIC ANTIDEPRESSANTS: NEGATIVE
POC TEMPERATURE (URINE): 90
POC THC: NEGATIVE

## 2022-09-15 PROCEDURE — 3008F BODY MASS INDEX DOCD: CPT | Mod: ,,, | Performed by: FAMILY MEDICINE

## 2022-09-15 PROCEDURE — 3077F PR MOST RECENT SYSTOLIC BLOOD PRESSURE >= 140 MM HG: ICD-10-PCS | Mod: ,,, | Performed by: FAMILY MEDICINE

## 2022-09-15 PROCEDURE — 1159F MED LIST DOCD IN RCRD: CPT | Mod: ,,, | Performed by: FAMILY MEDICINE

## 2022-09-15 PROCEDURE — 90715 TDAP VACCINE GREATER THAN OR EQUAL TO 7YO IM: ICD-10-PCS | Mod: ,,, | Performed by: FAMILY MEDICINE

## 2022-09-15 PROCEDURE — 3044F HG A1C LEVEL LT 7.0%: CPT | Mod: ,,, | Performed by: FAMILY MEDICINE

## 2022-09-15 PROCEDURE — 80305 POCT URINE DRUG SCREEN PRESUMP: ICD-10-PCS | Mod: QW,,, | Performed by: FAMILY MEDICINE

## 2022-09-15 PROCEDURE — 99213 PR OFFICE/OUTPT VISIT, EST, LEVL III, 20-29 MIN: ICD-10-PCS | Mod: 25,,, | Performed by: FAMILY MEDICINE

## 2022-09-15 PROCEDURE — 81025 URINE PREGNANCY TEST: CPT | Mod: QW,,, | Performed by: FAMILY MEDICINE

## 2022-09-15 PROCEDURE — 90471 TDAP VACCINE GREATER THAN OR EQUAL TO 7YO IM: ICD-10-PCS | Mod: ,,, | Performed by: FAMILY MEDICINE

## 2022-09-15 PROCEDURE — 1160F PR REVIEW ALL MEDS BY PRESCRIBER/CLIN PHARMACIST DOCUMENTED: ICD-10-PCS | Mod: ,,, | Performed by: FAMILY MEDICINE

## 2022-09-15 PROCEDURE — 80305 DRUG TEST PRSMV DIR OPT OBS: CPT | Mod: QW,,, | Performed by: FAMILY MEDICINE

## 2022-09-15 PROCEDURE — 3077F SYST BP >= 140 MM HG: CPT | Mod: ,,, | Performed by: FAMILY MEDICINE

## 2022-09-15 PROCEDURE — 90471 IMMUNIZATION ADMIN: CPT | Mod: ,,, | Performed by: FAMILY MEDICINE

## 2022-09-15 PROCEDURE — 4010F ACE/ARB THERAPY RXD/TAKEN: CPT | Mod: ,,, | Performed by: FAMILY MEDICINE

## 2022-09-15 PROCEDURE — 3080F DIAST BP >= 90 MM HG: CPT | Mod: ,,, | Performed by: FAMILY MEDICINE

## 2022-09-15 PROCEDURE — 99213 OFFICE O/P EST LOW 20 MIN: CPT | Mod: 25,,, | Performed by: FAMILY MEDICINE

## 2022-09-15 PROCEDURE — 1159F PR MEDICATION LIST DOCUMENTED IN MEDICAL RECORD: ICD-10-PCS | Mod: ,,, | Performed by: FAMILY MEDICINE

## 2022-09-15 PROCEDURE — 3008F PR BODY MASS INDEX (BMI) DOCUMENTED: ICD-10-PCS | Mod: ,,, | Performed by: FAMILY MEDICINE

## 2022-09-15 PROCEDURE — 1160F RVW MEDS BY RX/DR IN RCRD: CPT | Mod: ,,, | Performed by: FAMILY MEDICINE

## 2022-09-15 PROCEDURE — 4010F PR ACE/ARB THEARPY RXD/TAKEN: ICD-10-PCS | Mod: ,,, | Performed by: FAMILY MEDICINE

## 2022-09-15 PROCEDURE — 3080F PR MOST RECENT DIASTOLIC BLOOD PRESSURE >= 90 MM HG: ICD-10-PCS | Mod: ,,, | Performed by: FAMILY MEDICINE

## 2022-09-15 PROCEDURE — 3044F PR MOST RECENT HEMOGLOBIN A1C LEVEL <7.0%: ICD-10-PCS | Mod: ,,, | Performed by: FAMILY MEDICINE

## 2022-09-15 PROCEDURE — 90715 TDAP VACCINE 7 YRS/> IM: CPT | Mod: ,,, | Performed by: FAMILY MEDICINE

## 2022-09-15 PROCEDURE — 81025 POCT URINE PREGNANCY: ICD-10-PCS | Mod: QW,,, | Performed by: FAMILY MEDICINE

## 2022-09-15 RX ORDER — DEXTROAMPHETAMINE SACCHARATE, AMPHETAMINE ASPARTATE, DEXTROAMPHETAMINE SULFATE AND AMPHETAMINE SULFATE 2.5; 2.5; 2.5; 2.5 MG/1; MG/1; MG/1; MG/1
1 TABLET ORAL NIGHTLY
Qty: 30 TABLET | Refills: 0 | Status: CANCELLED | OUTPATIENT
Start: 2022-09-15 | End: 2022-10-15

## 2022-09-15 RX ORDER — DEXTROAMPHETAMINE SACCHARATE, AMPHETAMINE ASPARTATE MONOHYDRATE, DEXTROAMPHETAMINE SULFATE AND AMPHETAMINE SULFATE 6.25; 6.25; 6.25; 6.25 MG/1; MG/1; MG/1; MG/1
25 CAPSULE, EXTENDED RELEASE ORAL EVERY MORNING
Qty: 30 CAPSULE | Refills: 0 | Status: SHIPPED | OUTPATIENT
Start: 2022-09-15 | End: 2022-10-17 | Stop reason: SDUPTHER

## 2022-09-15 RX ORDER — DEXTROAMPHETAMINE SACCHARATE, AMPHETAMINE ASPARTATE MONOHYDRATE, DEXTROAMPHETAMINE SULFATE AND AMPHETAMINE SULFATE 6.25; 6.25; 6.25; 6.25 MG/1; MG/1; MG/1; MG/1
CAPSULE, EXTENDED RELEASE ORAL EVERY MORNING
Status: CANCELLED | OUTPATIENT
Start: 2022-09-15

## 2022-09-15 RX ORDER — DEXTROAMPHETAMINE SACCHARATE, AMPHETAMINE ASPARTATE, DEXTROAMPHETAMINE SULFATE AND AMPHETAMINE SULFATE 2.5; 2.5; 2.5; 2.5 MG/1; MG/1; MG/1; MG/1
1 TABLET ORAL NIGHTLY
Status: CANCELLED | OUTPATIENT
Start: 2022-09-15

## 2022-09-15 RX ORDER — DEXTROAMPHETAMINE SACCHARATE, AMPHETAMINE ASPARTATE, DEXTROAMPHETAMINE SULFATE AND AMPHETAMINE SULFATE 5; 5; 5; 5 MG/1; MG/1; MG/1; MG/1
1 TABLET ORAL DAILY
Qty: 30 TABLET | Refills: 0 | Status: SHIPPED | OUTPATIENT
Start: 2022-09-15 | End: 2022-10-13 | Stop reason: SDUPTHER

## 2022-09-15 NOTE — PROGRESS NOTES
Beth Lundy MD        PATIENT NAME: Earline Lemus  : 1985  DATE: 9/15/22  MRN: [id number]      Billing Provider: Beth Lundy MD  Level of Service:   Patient PCP Information       Provider PCP Type    hSira Harry NP General            Reason for Visit / Chief Complaint: Medication Refill       History of Present Illness      Earline Lemus presents to the clinic with Medication Refill     ADD- diagnosed when she was a child has been on several medications, currently having a crash around 2 Pm with current medications the 10mg feels like it is not doing much of anything    HTN- diagnosed about a year ago, runs in the family, noted BP today    She currently has a fracture and is being treated for this, in a boot, took a norco last night due to pain     Medication Refill  Associated symptoms include arthralgias and joint swelling. Pertinent negatives include no fatigue or fever.     Review of Systems     Review of Systems   Constitutional:  Negative for activity change, appetite change, fatigue and fever.   Respiratory:  Negative for shortness of breath.    Musculoskeletal:  Positive for arthralgias, gait problem and joint swelling.   Allergic/Immunologic: Positive for environmental allergies.   Psychiatric/Behavioral:  Negative for agitation, behavioral problems and suicidal ideas.      Medical / Social / Family History     Past Medical History:   Diagnosis Date    ADHD (attention deficit hyperactivity disorder)     Anxiety     COVID 2022    Depression     Hypertension     Maxillary sinusitis 2021    Pneumonia of right middle lobe due to infectious organism 2022       Past Surgical History:   Procedure Laterality Date    TUBAL LIGATION         Social History  Ms.  reports that she has been smoking cigarettes and vaping with nicotine. She has been smoking an average of .5 packs per day. She has never used smokeless tobacco. She reports current alcohol use. She reports that she does  "not use drugs.    Family History  Ms.'s family history includes Heart disease in her father.    Medications and Allergies     Medications  Outpatient Medications Marked as Taking for the 9/15/22 encounter (Office Visit) with Beth Lundy MD   Medication Sig Dispense Refill    dextroamphetamine-amphetamine 10 mg Tab Take 1 tablet by mouth every evening.      HYDROcodone-acetaminophen (NORCO) 5-325 mg per tablet Take 1 tablet by mouth every 6 (six) hours as needed for Pain. 28 tablet 0    HYDROcodone-acetaminophen (NORCO) 7.5-325 mg per tablet Take 1 tablet by mouth every 6 (six) hours as needed for Pain. 28 tablet 0    omeprazole (PRILOSEC) 40 MG capsule Take 40 mg by mouth 2 (two) times daily.      sucralfate (CARAFATE) 1 gram tablet Take 1 tablet (1 g total) by mouth 2 (two) times daily. 60 tablet 0    telmisartan (MICARDIS) 40 MG Tab Take 1 tablet (40 mg total) by mouth once daily. 90 tablet 0    [DISCONTINUED] dextroamphetamine-amphetamine (ADDERALL XR) 25 MG 24 hr capsule Take 1 tablet by mouth every morning.         Allergies  Review of patient's allergies indicates:  No Known Allergies    Physical Examination   BP (!) 143/94   Pulse 79   Resp 20   Ht 5' 5" (1.651 m)   Wt 79.4 kg (175 lb)   LMP 08/24/2022   SpO2 98%   BMI 29.12 kg/m²     Physical Exam  Constitutional:       Appearance: Normal appearance. She is normal weight.   Cardiovascular:      Rate and Rhythm: Normal rate and regular rhythm.      Pulses: Normal pulses.      Heart sounds: Normal heart sounds.   Abdominal:      General: Abdomen is flat. Bowel sounds are normal.   Musculoskeletal:      Left foot: Decreased range of motion.   Skin:     General: Skin is warm.   Neurological:      General: No focal deficit present.      Mental Status: She is alert.   Psychiatric:         Mood and Affect: Mood normal.         Behavior: Behavior normal.         Judgment: Judgment normal.       Recent Results (from the past 24 hour(s))   POCT urine " pregnancy    Collection Time: 09/15/22 11:50 AM   Result Value Ref Range    POC Preg Test, Ur Negative Negative     Acceptable Yes    POCT Urine Drug Screen Presump    Collection Time: 09/15/22 11:51 AM   Result Value Ref Range    POC Amphetamines Presumptive Positive (A) Negative, Inconclusive    POC Barbiturates Negative Negative, Inconclusive    POC Benzodiazepines Negative Negative, Inconclusive    POC Cocaine Negative Negative, Inconclusive    POC THC Negative Negative, Inconclusive    POC Methadone Negative Negative, Inconclusive    POC Methamphetamine Negative Negative, Inconclusive    POC Opiates Presumptive Positive (A) Negative, Inconclusive    POC Oxycodone Negative Negative, Inconclusive    POC Phencyclidine Negative Negative, Inconclusive    POC Methylenedioxymethamphetamine 3,4 Negative Negative, Inconclusive    POC Tricyclic Antidepressants Negative Negative, Inconclusive    POC Buprenorphine Negative      Acceptable Yes     POC Temperature (Urine) 90         Assessment and Plan (including Health Maintenance)     Plan:         Problem List Items Addressed This Visit          Cardiac/Vascular    Hypertension     Other Visit Diagnoses       Attention deficit disorder (ADD) without hyperactivity    -  Primary    Relevant Orders    POCT Urine Drug Screen Presump (Completed)    POCT urine pregnancy (Completed)    History of long-term treatment with high-risk medication        Relevant Orders    POCT Urine Drug Screen Presump (Completed)    POCT urine pregnancy (Completed)    Need for vaccination        Relevant Orders    (In Office Administered) Tdap Vaccine (Completed)           reviewed  Refills send  Increase adderral PM dose  Will keep a log of BP and bring it on follow up to see how this does    Follow up in about 1 month (around 10/15/2022).        Signature:  Beth Lundy MD      Date of encounter: 9/15/22

## 2022-10-13 ENCOUNTER — OFFICE VISIT (OUTPATIENT)
Dept: FAMILY MEDICINE | Facility: CLINIC | Age: 37
End: 2022-10-13
Payer: COMMERCIAL

## 2022-10-13 VITALS
HEIGHT: 65 IN | DIASTOLIC BLOOD PRESSURE: 89 MMHG | RESPIRATION RATE: 18 BRPM | WEIGHT: 183.38 LBS | OXYGEN SATURATION: 98 % | BODY MASS INDEX: 30.55 KG/M2 | HEART RATE: 73 BPM | TEMPERATURE: 98 F | SYSTOLIC BLOOD PRESSURE: 134 MMHG

## 2022-10-13 DIAGNOSIS — F98.8 ATTENTION DEFICIT DISORDER (ADD) WITHOUT HYPERACTIVITY: Primary | ICD-10-CM

## 2022-10-13 DIAGNOSIS — G89.29 CHRONIC RIGHT SHOULDER PAIN: ICD-10-CM

## 2022-10-13 DIAGNOSIS — M25.511 CHRONIC RIGHT SHOULDER PAIN: ICD-10-CM

## 2022-10-13 PROCEDURE — 20610 DRAIN/INJ JOINT/BURSA W/O US: CPT | Mod: RT,,, | Performed by: FAMILY MEDICINE

## 2022-10-13 PROCEDURE — 3075F PR MOST RECENT SYSTOLIC BLOOD PRESS GE 130-139MM HG: ICD-10-PCS | Mod: ,,, | Performed by: FAMILY MEDICINE

## 2022-10-13 PROCEDURE — 99213 PR OFFICE/OUTPT VISIT, EST, LEVL III, 20-29 MIN: ICD-10-PCS | Mod: 25,,, | Performed by: FAMILY MEDICINE

## 2022-10-13 PROCEDURE — 1159F MED LIST DOCD IN RCRD: CPT | Mod: ,,, | Performed by: FAMILY MEDICINE

## 2022-10-13 PROCEDURE — 4010F ACE/ARB THERAPY RXD/TAKEN: CPT | Mod: ,,, | Performed by: FAMILY MEDICINE

## 2022-10-13 PROCEDURE — 3079F DIAST BP 80-89 MM HG: CPT | Mod: ,,, | Performed by: FAMILY MEDICINE

## 2022-10-13 PROCEDURE — 3044F PR MOST RECENT HEMOGLOBIN A1C LEVEL <7.0%: ICD-10-PCS | Mod: ,,, | Performed by: FAMILY MEDICINE

## 2022-10-13 PROCEDURE — 4010F PR ACE/ARB THEARPY RXD/TAKEN: ICD-10-PCS | Mod: ,,, | Performed by: FAMILY MEDICINE

## 2022-10-13 PROCEDURE — 99213 OFFICE O/P EST LOW 20 MIN: CPT | Mod: 25,,, | Performed by: FAMILY MEDICINE

## 2022-10-13 PROCEDURE — 3079F PR MOST RECENT DIASTOLIC BLOOD PRESSURE 80-89 MM HG: ICD-10-PCS | Mod: ,,, | Performed by: FAMILY MEDICINE

## 2022-10-13 PROCEDURE — 3075F SYST BP GE 130 - 139MM HG: CPT | Mod: ,,, | Performed by: FAMILY MEDICINE

## 2022-10-13 PROCEDURE — 1160F PR REVIEW ALL MEDS BY PRESCRIBER/CLIN PHARMACIST DOCUMENTED: ICD-10-PCS | Mod: ,,, | Performed by: FAMILY MEDICINE

## 2022-10-13 PROCEDURE — 3044F HG A1C LEVEL LT 7.0%: CPT | Mod: ,,, | Performed by: FAMILY MEDICINE

## 2022-10-13 PROCEDURE — 20610 PR DRAIN/INJECT LARGE JOINT/BURSA: ICD-10-PCS | Mod: RT,,, | Performed by: FAMILY MEDICINE

## 2022-10-13 PROCEDURE — 1159F PR MEDICATION LIST DOCUMENTED IN MEDICAL RECORD: ICD-10-PCS | Mod: ,,, | Performed by: FAMILY MEDICINE

## 2022-10-13 PROCEDURE — 1160F RVW MEDS BY RX/DR IN RCRD: CPT | Mod: ,,, | Performed by: FAMILY MEDICINE

## 2022-10-13 RX ORDER — DEXTROAMPHETAMINE SACCHARATE, AMPHETAMINE ASPARTATE MONOHYDRATE, DEXTROAMPHETAMINE SULFATE AND AMPHETAMINE SULFATE 5; 5; 5; 5 MG/1; MG/1; MG/1; MG/1
20 CAPSULE, EXTENDED RELEASE ORAL EVERY MORNING
Qty: 30 CAPSULE | Refills: 0 | Status: SHIPPED | OUTPATIENT
Start: 2022-01-01 | End: 2022-10-17

## 2022-10-13 RX ORDER — TRIAMCINOLONE ACETONIDE 40 MG/ML
40 INJECTION, SUSPENSION INTRA-ARTICULAR; INTRAMUSCULAR ONCE
Status: COMPLETED | OUTPATIENT
Start: 2022-10-13 | End: 2022-10-13

## 2022-10-13 RX ORDER — DEXTROAMPHETAMINE SACCHARATE, AMPHETAMINE ASPARTATE MONOHYDRATE, DEXTROAMPHETAMINE SULFATE AND AMPHETAMINE SULFATE 6.25; 6.25; 6.25; 6.25 MG/1; MG/1; MG/1; MG/1
25 CAPSULE, EXTENDED RELEASE ORAL EVERY MORNING
Qty: 30 CAPSULE | Refills: 0 | Status: CANCELLED | OUTPATIENT
Start: 2022-10-13 | End: 2022-11-12

## 2022-10-13 RX ORDER — DEXTROAMPHETAMINE SACCHARATE, AMPHETAMINE ASPARTATE, DEXTROAMPHETAMINE SULFATE AND AMPHETAMINE SULFATE 2.5; 2.5; 2.5; 2.5 MG/1; MG/1; MG/1; MG/1
1 TABLET ORAL NIGHTLY
Qty: 30 TABLET | Refills: 0 | Status: SHIPPED | OUTPATIENT
Start: 2022-10-13 | End: 2022-10-17 | Stop reason: SDUPTHER

## 2022-10-13 RX ORDER — DEXTROAMPHETAMINE SACCHARATE, AMPHETAMINE ASPARTATE, DEXTROAMPHETAMINE SULFATE AND AMPHETAMINE SULFATE 2.5; 2.5; 2.5; 2.5 MG/1; MG/1; MG/1; MG/1
1 TABLET ORAL DAILY
Qty: 30 TABLET | Refills: 0 | Status: SHIPPED | OUTPATIENT
Start: 2022-01-01 | End: 2022-10-17 | Stop reason: SDUPTHER

## 2022-10-13 RX ORDER — DEXTROAMPHETAMINE SACCHARATE, AMPHETAMINE ASPARTATE MONOHYDRATE, DEXTROAMPHETAMINE SULFATE AND AMPHETAMINE SULFATE 5; 5; 5; 5 MG/1; MG/1; MG/1; MG/1
20 CAPSULE, EXTENDED RELEASE ORAL EVERY MORNING
Qty: 30 CAPSULE | Refills: 0 | Status: SHIPPED | OUTPATIENT
Start: 2022-11-12 | End: 2022-10-17

## 2022-10-13 RX ORDER — DEXTROAMPHETAMINE SACCHARATE, AMPHETAMINE ASPARTATE, DEXTROAMPHETAMINE SULFATE AND AMPHETAMINE SULFATE 2.5; 2.5; 2.5; 2.5 MG/1; MG/1; MG/1; MG/1
1 TABLET ORAL DAILY
Qty: 30 TABLET | Refills: 0 | Status: SHIPPED | OUTPATIENT
Start: 2022-11-12 | End: 2022-10-17 | Stop reason: SDUPTHER

## 2022-10-13 RX ORDER — DEXTROAMPHETAMINE SACCHARATE, AMPHETAMINE ASPARTATE, DEXTROAMPHETAMINE SULFATE AND AMPHETAMINE SULFATE 5; 5; 5; 5 MG/1; MG/1; MG/1; MG/1
1 TABLET ORAL DAILY
Qty: 30 TABLET | Refills: 0 | Status: CANCELLED | OUTPATIENT
Start: 2022-10-13 | End: 2022-11-12

## 2022-10-13 RX ORDER — DEXTROAMPHETAMINE SACCHARATE, AMPHETAMINE ASPARTATE, DEXTROAMPHETAMINE SULFATE AND AMPHETAMINE SULFATE 5; 5; 5; 5 MG/1; MG/1; MG/1; MG/1
1 TABLET ORAL DAILY
Qty: 30 TABLET | Refills: 0 | Status: SHIPPED | OUTPATIENT
Start: 2022-10-13 | End: 2022-10-17

## 2022-10-13 RX ADMIN — TRIAMCINOLONE ACETONIDE 40 MG: 40 INJECTION, SUSPENSION INTRA-ARTICULAR; INTRAMUSCULAR at 09:10

## 2022-10-13 NOTE — PROGRESS NOTES
Beth Lundy MD        PATIENT NAME: Earline Lemus  : 1985  DATE: 10/13/22  MRN: [id number]      Billing Provider: Beth Lundy MD  Level of Service:   Patient PCP Information       Provider PCP Type    Shira Harry NP General            Reason for Visit / Chief Complaint: Medication Refill       History of Present Illness      Earline Lemus presents to the clinic with Medication Refill     She is here to follow up on recent changes on medication, she was having a hard time with her afternoon task at work, she is now feeling better and feeling more alert in the afternoons, sleeping very well    She has a hiatal hernia, currently well controlled with PPI    Right shoulder pain, severe, worse when she sustained a fall a week ago, has been hurting for years, but now worse, can not abduct beyond 45 degrees. Supination and pronation has weaker     Medication Refill  Associated symptoms include arthralgias and joint swelling. Pertinent negatives include no fatigue or fever.     Review of Systems     Review of Systems   Constitutional:  Negative for activity change, appetite change, fatigue and fever.   Respiratory:  Negative for shortness of breath.    Musculoskeletal:  Positive for arthralgias and joint swelling. Negative for gait problem.   Allergic/Immunologic: Positive for environmental allergies.   Psychiatric/Behavioral:  Negative for agitation, behavioral problems and suicidal ideas.      Medical / Social / Family History     Past Medical History:   Diagnosis Date    ADHD (attention deficit hyperactivity disorder)     Anxiety     COVID 2022    Depression     Hypertension     Maxillary sinusitis 2021    Pneumonia of right middle lobe due to infectious organism 2022       Past Surgical History:   Procedure Laterality Date    TUBAL LIGATION         Social History  Ms.  reports that she has been smoking cigarettes and vaping with nicotine. She has been smoking an average of .5 packs  "per day. She has never used smokeless tobacco. She reports current alcohol use. She reports that she does not use drugs.    Family History  Ms.'s family history includes Heart disease in her father.    Medications and Allergies     Medications  Outpatient Medications Marked as Taking for the 10/13/22 encounter (Office Visit) with Beth Lundy MD   Medication Sig Dispense Refill    dextroamphetamine-amphetamine (ADDERALL XR) 25 MG 24 hr capsule Take 1 capsule (25 mg total) by mouth every morning. 30 capsule 0    dextroamphetamine-amphetamine (ADDERALL) 20 mg tablet Take 1 tablet by mouth once daily. 30 tablet 0    omeprazole (PRILOSEC) 40 MG capsule Take 40 mg by mouth 2 (two) times daily.      sucralfate (CARAFATE) 1 gram tablet Take 1 tablet (1 g total) by mouth 2 (two) times daily. 60 tablet 0    telmisartan (MICARDIS) 40 MG Tab Take 1 tablet (40 mg total) by mouth once daily. 90 tablet 0    [DISCONTINUED] dextroamphetamine-amphetamine (ADDERALL) 20 mg tablet Take 1 tablet by mouth once daily. 30 tablet 0     Current Facility-Administered Medications for the 10/13/22 encounter (Office Visit) with Beth Lundy MD   Medication Dose Route Frequency Provider Last Rate Last Admin    [COMPLETED] triamcinolone acetonide injection 40 mg  40 mg Intramuscular Once Beth Lundy MD   40 mg at 10/13/22 0952       Allergies  Review of patient's allergies indicates:  No Known Allergies    Physical Examination   /89 (BP Location: Left arm, Patient Position: Sitting, BP Method: Medium (Automatic))   Pulse 73   Temp 98.2 °F (36.8 °C) (Oral)   Resp 18   Ht 5' 5" (1.651 m)   Wt 83.2 kg (183 lb 6.4 oz)   SpO2 98%   BMI 30.52 kg/m²     Physical Exam  Constitutional:       Appearance: Normal appearance. She is normal weight.   Cardiovascular:      Rate and Rhythm: Normal rate and regular rhythm.      Pulses: Normal pulses.      Heart sounds: Normal heart sounds.   Pulmonary:      Effort: Pulmonary effort is normal.    "   Breath sounds: Normal breath sounds.   Musculoskeletal:      Right shoulder: Decreased range of motion. Decreased strength.   Skin:     General: Skin is warm.   Neurological:      General: No focal deficit present.      Mental Status: She is alert.   Psychiatric:         Mood and Affect: Mood normal.         Behavior: Behavior normal.         Judgment: Judgment normal.        Procedure: Shoulder injection  Performed by Dr. Lundy  Reason for procedure:  After informed consent was obtained, A Time out was performed with patient.  Area was cleansed with rubbing alcohol in sterile fashion. Topical anesthesia was then achieved using Gebaur's Ethyl Chloride. The right shoulder joint was then injected using a posterior approach. After initially entering joint, plunger was withdrawn to prevent entry into blood vessel. After appropriate anatomical location was confirmed, 3mL of 1%Lidocaine w/o Epi and 1mL of Kenalog (40mg) with 3 mL of 0.25% marcaine was injected into the joint. The needle was withdrawn and direct pressure was applied over the site. The joint was then mobilized into an abbreviated arc of motion and pt was advised to stay seated to prevent any falls after injection. Pt tolerated procedure well and bandage was placed over injection site.    After care instruction given    Assessment and Plan (including Health Maintenance)     Plan:         Problem List Items Addressed This Visit    None  Visit Diagnoses       Attention deficit disorder (ADD) without hyperactivity    -  Primary    Relevant Medications    dextroamphetamine-amphetamine (ADDERALL) 20 mg tablet    dextroamphetamine-amphetamine 10 mg Tab    dextroamphetamine-amphetamine (ADDERALL XR) 20 MG 24 hr capsule (Start on 11/12/2022)    dextroamphetamine-amphetamine (ADDERALL XR) 20 MG 24 hr capsule (Start on 12/12/2022)    dextroamphetamine-amphetamine 10 mg Tab (Start on 11/12/2022)    dextroamphetamine-amphetamine (ADDERALL) 10 mg Tab (Start on  12/12/2022)    Chronic right shoulder pain        Relevant Medications    triamcinolone acetonide injection 40 mg (Completed)    Other Relevant Orders    X-ray Shoulder 2 or More Views Right (Completed)            -  reviewed, will werner UDS next visit    Follow up in about 3 months (around 1/13/2023).        Signature:  Beth Lundy MD      Date of encounter: 10/13/22

## 2022-10-17 RX ORDER — DEXTROAMPHETAMINE SACCHARATE, AMPHETAMINE ASPARTATE, DEXTROAMPHETAMINE SULFATE AND AMPHETAMINE SULFATE 2.5; 2.5; 2.5; 2.5 MG/1; MG/1; MG/1; MG/1
1 TABLET ORAL NIGHTLY
Qty: 30 TABLET | Refills: 0 | Status: SHIPPED | OUTPATIENT
Start: 2022-10-17 | End: 2022-10-17

## 2022-10-17 RX ORDER — DEXTROAMPHETAMINE SACCHARATE, AMPHETAMINE ASPARTATE MONOHYDRATE, DEXTROAMPHETAMINE SULFATE AND AMPHETAMINE SULFATE 6.25; 6.25; 6.25; 6.25 MG/1; MG/1; MG/1; MG/1
25 CAPSULE, EXTENDED RELEASE ORAL EVERY MORNING
Qty: 30 CAPSULE | Refills: 0 | Status: SHIPPED | OUTPATIENT
Start: 2022-01-01 | End: 2023-01-01

## 2022-10-17 RX ORDER — DEXTROAMPHETAMINE SACCHARATE, AMPHETAMINE ASPARTATE MONOHYDRATE, DEXTROAMPHETAMINE SULFATE AND AMPHETAMINE SULFATE 5; 5; 5; 5 MG/1; MG/1; MG/1; MG/1
20 CAPSULE, EXTENDED RELEASE ORAL EVERY MORNING
Qty: 30 CAPSULE | Refills: 0 | Status: SHIPPED | OUTPATIENT
Start: 2022-01-01 | End: 2022-01-01 | Stop reason: SDUPTHER

## 2022-10-17 RX ORDER — DEXTROAMPHETAMINE SACCHARATE, AMPHETAMINE ASPARTATE MONOHYDRATE, DEXTROAMPHETAMINE SULFATE AND AMPHETAMINE SULFATE 5; 5; 5; 5 MG/1; MG/1; MG/1; MG/1
20 CAPSULE, EXTENDED RELEASE ORAL EVERY MORNING
Qty: 30 CAPSULE | Refills: 0 | Status: SHIPPED | OUTPATIENT
Start: 2022-11-12 | End: 2022-01-01

## 2022-10-17 RX ORDER — DEXTROAMPHETAMINE SACCHARATE, AMPHETAMINE ASPARTATE MONOHYDRATE, DEXTROAMPHETAMINE SULFATE AND AMPHETAMINE SULFATE 6.25; 6.25; 6.25; 6.25 MG/1; MG/1; MG/1; MG/1
25 CAPSULE, EXTENDED RELEASE ORAL EVERY MORNING
Qty: 30 CAPSULE | Refills: 0 | Status: SHIPPED | OUTPATIENT
Start: 2022-11-16 | End: 2022-01-01

## 2022-10-17 RX ORDER — DEXTROAMPHETAMINE SACCHARATE, AMPHETAMINE ASPARTATE, DEXTROAMPHETAMINE SULFATE AND AMPHETAMINE SULFATE 2.5; 2.5; 2.5; 2.5 MG/1; MG/1; MG/1; MG/1
1 TABLET ORAL DAILY
Qty: 30 TABLET | Refills: 0 | Status: SHIPPED | OUTPATIENT
Start: 2022-11-12 | End: 2022-10-17

## 2022-10-17 RX ORDER — DEXTROAMPHETAMINE SACCHARATE, AMPHETAMINE ASPARTATE, DEXTROAMPHETAMINE SULFATE AND AMPHETAMINE SULFATE 2.5; 2.5; 2.5; 2.5 MG/1; MG/1; MG/1; MG/1
1 TABLET ORAL DAILY
Qty: 30 TABLET | Refills: 0 | Status: SHIPPED | OUTPATIENT
Start: 2022-01-01 | End: 2022-10-17

## 2022-10-17 RX ORDER — DEXTROAMPHETAMINE SACCHARATE, AMPHETAMINE ASPARTATE MONOHYDRATE, DEXTROAMPHETAMINE SULFATE AND AMPHETAMINE SULFATE 6.25; 6.25; 6.25; 6.25 MG/1; MG/1; MG/1; MG/1
25 CAPSULE, EXTENDED RELEASE ORAL EVERY MORNING
Qty: 30 CAPSULE | Refills: 0 | Status: SHIPPED | OUTPATIENT
Start: 2022-10-17 | End: 2022-11-16

## 2022-10-17 RX ORDER — DEXTROAMPHETAMINE SACCHARATE, AMPHETAMINE ASPARTATE, DEXTROAMPHETAMINE SULFATE AND AMPHETAMINE SULFATE 5; 5; 5; 5 MG/1; MG/1; MG/1; MG/1
1 TABLET ORAL DAILY
Qty: 30 TABLET | Refills: 0 | Status: SHIPPED | OUTPATIENT
Start: 2022-10-17 | End: 2022-11-16

## 2022-11-15 ENCOUNTER — OFFICE VISIT (OUTPATIENT)
Dept: FAMILY MEDICINE | Facility: CLINIC | Age: 37
End: 2022-11-15
Payer: COMMERCIAL

## 2022-11-15 VITALS
SYSTOLIC BLOOD PRESSURE: 134 MMHG | WEIGHT: 182 LBS | OXYGEN SATURATION: 99 % | HEART RATE: 105 BPM | DIASTOLIC BLOOD PRESSURE: 76 MMHG | BODY MASS INDEX: 30.32 KG/M2 | HEIGHT: 65 IN | RESPIRATION RATE: 18 BRPM

## 2022-11-15 DIAGNOSIS — Z20.822 COVID-19 RULED OUT: ICD-10-CM

## 2022-11-15 DIAGNOSIS — U07.1 COVID-19: Primary | ICD-10-CM

## 2022-11-15 LAB
CTP QC/QA: YES
FLUAV AG NPH QL: NEGATIVE
FLUBV AG NPH QL: NEGATIVE
SARS-COV-2 AG RESP QL IA.RAPID: POSITIVE

## 2022-11-15 PROCEDURE — 4010F PR ACE/ARB THEARPY RXD/TAKEN: ICD-10-PCS | Mod: ,,, | Performed by: FAMILY MEDICINE

## 2022-11-15 PROCEDURE — 99213 OFFICE O/P EST LOW 20 MIN: CPT | Mod: ,,, | Performed by: FAMILY MEDICINE

## 2022-11-15 PROCEDURE — 87428 POCT SARS-COV2 (COVID) WITH FLU ANTIGEN: ICD-10-PCS | Mod: QW,,, | Performed by: FAMILY MEDICINE

## 2022-11-15 PROCEDURE — 3075F PR MOST RECENT SYSTOLIC BLOOD PRESS GE 130-139MM HG: ICD-10-PCS | Mod: ,,, | Performed by: FAMILY MEDICINE

## 2022-11-15 PROCEDURE — 3078F PR MOST RECENT DIASTOLIC BLOOD PRESSURE < 80 MM HG: ICD-10-PCS | Mod: ,,, | Performed by: FAMILY MEDICINE

## 2022-11-15 PROCEDURE — 3078F DIAST BP <80 MM HG: CPT | Mod: ,,, | Performed by: FAMILY MEDICINE

## 2022-11-15 PROCEDURE — 1159F PR MEDICATION LIST DOCUMENTED IN MEDICAL RECORD: ICD-10-PCS | Mod: ,,, | Performed by: FAMILY MEDICINE

## 2022-11-15 PROCEDURE — 87428 SARSCOV & INF VIR A&B AG IA: CPT | Mod: QW,,, | Performed by: FAMILY MEDICINE

## 2022-11-15 PROCEDURE — 3075F SYST BP GE 130 - 139MM HG: CPT | Mod: ,,, | Performed by: FAMILY MEDICINE

## 2022-11-15 PROCEDURE — 3044F PR MOST RECENT HEMOGLOBIN A1C LEVEL <7.0%: ICD-10-PCS | Mod: ,,, | Performed by: FAMILY MEDICINE

## 2022-11-15 PROCEDURE — 3044F HG A1C LEVEL LT 7.0%: CPT | Mod: ,,, | Performed by: FAMILY MEDICINE

## 2022-11-15 PROCEDURE — 1160F RVW MEDS BY RX/DR IN RCRD: CPT | Mod: ,,, | Performed by: FAMILY MEDICINE

## 2022-11-15 PROCEDURE — 3008F PR BODY MASS INDEX (BMI) DOCUMENTED: ICD-10-PCS | Mod: ,,, | Performed by: FAMILY MEDICINE

## 2022-11-15 PROCEDURE — 4010F ACE/ARB THERAPY RXD/TAKEN: CPT | Mod: ,,, | Performed by: FAMILY MEDICINE

## 2022-11-15 PROCEDURE — 1159F MED LIST DOCD IN RCRD: CPT | Mod: ,,, | Performed by: FAMILY MEDICINE

## 2022-11-15 PROCEDURE — 1160F PR REVIEW ALL MEDS BY PRESCRIBER/CLIN PHARMACIST DOCUMENTED: ICD-10-PCS | Mod: ,,, | Performed by: FAMILY MEDICINE

## 2022-11-15 PROCEDURE — 99213 PR OFFICE/OUTPT VISIT, EST, LEVL III, 20-29 MIN: ICD-10-PCS | Mod: ,,, | Performed by: FAMILY MEDICINE

## 2022-11-15 PROCEDURE — 3008F BODY MASS INDEX DOCD: CPT | Mod: ,,, | Performed by: FAMILY MEDICINE

## 2022-11-15 RX ORDER — AZITHROMYCIN 250 MG/1
TABLET, FILM COATED ORAL
Qty: 6 TABLET | Refills: 0 | Status: SHIPPED | OUTPATIENT
Start: 2022-11-15 | End: 2022-11-20

## 2022-11-15 NOTE — PROGRESS NOTES
Beth Lundy MD        PATIENT NAME: Earline Lemus  : 1985  DATE: 11/15/22  MRN: [id number]      Billing Provider: Beth Lundy MD  Level of Service:   Patient PCP Information       Provider PCP Type    Beth Lundy MD General            Reason for Visit / Chief Complaint: Nasal Congestion (Yellow mucous started Saturday evening  ), Cough, and Sore Throat       History of Present Illness      Earline Lemus presents to the clinic with Nasal Congestion (Yellow mucous started Saturday evening  ), Cough, and Sore Throat     Cough  Associated symptoms include a sore throat. Pertinent negatives include no fever or shortness of breath. Her past medical history is significant for environmental allergies.   Sore Throat   Associated symptoms include coughing. Pertinent negatives include no shortness of breath.     Review of Systems     Review of Systems   Constitutional:  Negative for activity change, appetite change, fatigue and fever.   HENT:  Positive for sore throat.    Respiratory:  Positive for cough. Negative for shortness of breath.    Allergic/Immunologic: Positive for environmental allergies.   Psychiatric/Behavioral:  Negative for agitation, behavioral problems and suicidal ideas.      Medical / Social / Family History     Past Medical History:   Diagnosis Date    ADHD (attention deficit hyperactivity disorder)     Anxiety     COVID 2022    Depression     Hypertension     Maxillary sinusitis 2021    Pneumonia of right middle lobe due to infectious organism 2022       Past Surgical History:   Procedure Laterality Date    TUBAL LIGATION         Social History  Ms.  reports that she has been smoking cigarettes and vaping with nicotine. She has been smoking an average of .5 packs per day. She has never used smokeless tobacco. She reports current alcohol use. She reports that she does not use drugs.    Family History  Ms.'s family history includes Heart disease in her  "father.    Medications and Allergies     Medications  Outpatient Medications Marked as Taking for the 11/15/22 encounter (Office Visit) with Beth Lundy MD   Medication Sig Dispense Refill    [START ON 12/12/2022] dextroamphetamine-amphetamine (ADDERALL XR) 20 MG 24 hr capsule Take 1 capsule (20 mg total) by mouth every morning. 30 capsule 0    [START ON 12/16/2022] dextroamphetamine-amphetamine (ADDERALL XR) 25 MG 24 hr capsule Take 1 capsule (25 mg total) by mouth every morning. 30 capsule 0    omeprazole (PRILOSEC) 40 MG capsule Take 40 mg by mouth 2 (two) times daily.      potassium chloride SA (K-DUR,KLOR-CON) 20 MEQ tablet Take 20 mEq by mouth once daily.      sucralfate (CARAFATE) 1 gram tablet Take 1 tablet (1 g total) by mouth 2 (two) times daily. 60 tablet 0    telmisartan (MICARDIS) 40 MG Tab Take 1 tablet (40 mg total) by mouth once daily. 90 tablet 0       Allergies  Review of patient's allergies indicates:  No Known Allergies    Physical Examination   /76 (BP Location: Left arm, Patient Position: Sitting, BP Method: Medium (Automatic))   Pulse 105   Resp 18   Ht 5' 5" (1.651 m)   Wt 82.6 kg (182 lb)   SpO2 99%   BMI 30.29 kg/m²     Physical Exam  Constitutional:       Appearance: Normal appearance. She is normal weight.   HENT:      Nose: Congestion present.      Mouth/Throat:      Pharynx: Posterior oropharyngeal erythema present.   Cardiovascular:      Rate and Rhythm: Normal rate and regular rhythm.      Pulses: Normal pulses.      Heart sounds: Normal heart sounds.   Pulmonary:      Breath sounds: Rales present.   Musculoskeletal:         General: Normal range of motion.   Skin:     General: Skin is warm.   Neurological:      General: No focal deficit present.      Mental Status: She is alert.   Psychiatric:         Mood and Affect: Mood normal.         Behavior: Behavior normal.         Judgment: Judgment normal.       Recent Results (from the past 24 hour(s))   POCT SARS-COV2 " (COVID) with Flu Antigen    Collection Time: 11/15/22  3:32 PM   Result Value Ref Range    SARS Coronavirus 2 Antigen Positive (A) Negative    Rapid Influenza A Ag Negative Negative    Rapid Influenza B Ag Negative Negative     Acceptable Yes         Assessment and Plan (including Health Maintenance)     Plan:         Problem List Items Addressed This Visit    None  Visit Diagnoses       COVID-19    -  Primary    Relevant Medications    azithromycin (Z-OFELIA) 250 MG tablet    COVID-19 ruled out              - quarantine  - inhaler sample trelegy given today    Follow up if symptoms worsen or fail to improve.        Signature:  Beth Lundy MD      Date of encounter: 11/15/22

## 2022-11-15 NOTE — LETTER
November 15, 2022      [facility]  [address] [place] [address]  Phone: [phone]  Fax: [phone]       Patient: Earline Lemus   YOB: 1985  Date of Visit: 11/15/2022    To Whom It May Concern:    Aquilino Lemus  was at [facility] on 11/15/2022. The patient may return to work/school on 11/18/2022 with no restrictions. If you have any questions or concerns, or if I can be of further assistance, please do not hesitate to contact me.    Sincerely,  MD Tamera Lara MA

## 2022-11-22 DIAGNOSIS — F98.8 ATTENTION DEFICIT DISORDER (ADD) WITHOUT HYPERACTIVITY: Primary | ICD-10-CM

## 2022-11-22 RX ORDER — DEXTROAMPHETAMINE SACCHARATE, AMPHETAMINE ASPARTATE MONOHYDRATE, DEXTROAMPHETAMINE SULFATE AND AMPHETAMINE SULFATE 7.5; 7.5; 7.5; 7.5 MG/1; MG/1; MG/1; MG/1
30 CAPSULE, EXTENDED RELEASE ORAL EVERY MORNING
Qty: 30 CAPSULE | Refills: 0 | Status: SHIPPED | OUTPATIENT
Start: 2022-11-22 | End: 2022-01-01 | Stop reason: SDUPTHER

## 2022-12-22 NOTE — TELEPHONE ENCOUNTER
Pt's pharmacy does not have the Adderall 25 mg and she is out after today. She called and the [facility] has 30 of the 30 mg tabs. [facility] to [facility] of [facility]?

## 2023-01-01 ENCOUNTER — PATIENT OUTREACH (OUTPATIENT)
Dept: ADMINISTRATIVE | Facility: HOSPITAL | Age: 38
End: 2023-01-01

## 2023-01-01 ENCOUNTER — OFFICE VISIT (OUTPATIENT)
Dept: FAMILY MEDICINE | Facility: CLINIC | Age: 38
End: 2023-01-01
Payer: COMMERCIAL

## 2023-01-01 ENCOUNTER — PATIENT MESSAGE (OUTPATIENT)
Dept: FAMILY MEDICINE | Facility: CLINIC | Age: 38
End: 2023-01-01
Payer: COMMERCIAL

## 2023-01-01 ENCOUNTER — TELEPHONE (OUTPATIENT)
Dept: FAMILY MEDICINE | Facility: CLINIC | Age: 38
End: 2023-01-01
Payer: COMMERCIAL

## 2023-01-01 ENCOUNTER — HOSPITAL ENCOUNTER (EMERGENCY)
Facility: HOSPITAL | Age: 38
End: 2023-12-01

## 2023-01-01 ENCOUNTER — PATIENT MESSAGE (OUTPATIENT)
Dept: ADMINISTRATIVE | Facility: HOSPITAL | Age: 38
End: 2023-01-01

## 2023-01-01 VITALS
HEIGHT: 65 IN | TEMPERATURE: 98 F | HEART RATE: 96 BPM | SYSTOLIC BLOOD PRESSURE: 118 MMHG | RESPIRATION RATE: 18 BRPM | RESPIRATION RATE: 18 BRPM | BODY MASS INDEX: 31.32 KG/M2 | OXYGEN SATURATION: 99 % | TEMPERATURE: 98 F | HEART RATE: 83 BPM | SYSTOLIC BLOOD PRESSURE: 132 MMHG | OXYGEN SATURATION: 99 % | DIASTOLIC BLOOD PRESSURE: 80 MMHG | WEIGHT: 192 LBS | BODY MASS INDEX: 31.99 KG/M2 | DIASTOLIC BLOOD PRESSURE: 90 MMHG | WEIGHT: 188 LBS | HEIGHT: 65 IN

## 2023-01-01 VITALS
SYSTOLIC BLOOD PRESSURE: 122 MMHG | HEIGHT: 65 IN | HEART RATE: 92 BPM | DIASTOLIC BLOOD PRESSURE: 87 MMHG | OXYGEN SATURATION: 99 % | TEMPERATURE: 97 F | WEIGHT: 190 LBS | BODY MASS INDEX: 31.65 KG/M2 | RESPIRATION RATE: 18 BRPM

## 2023-01-01 VITALS
HEIGHT: 65 IN | BODY MASS INDEX: 32.15 KG/M2 | WEIGHT: 193 LBS | RESPIRATION RATE: 19 BRPM | SYSTOLIC BLOOD PRESSURE: 114 MMHG | DIASTOLIC BLOOD PRESSURE: 78 MMHG | OXYGEN SATURATION: 97 % | TEMPERATURE: 98 F | HEART RATE: 80 BPM

## 2023-01-01 VITALS
HEART RATE: 99 BPM | DIASTOLIC BLOOD PRESSURE: 90 MMHG | BODY MASS INDEX: 31.65 KG/M2 | RESPIRATION RATE: 18 BRPM | OXYGEN SATURATION: 99 % | TEMPERATURE: 98 F | HEIGHT: 65 IN | WEIGHT: 190 LBS | SYSTOLIC BLOOD PRESSURE: 120 MMHG

## 2023-01-01 VITALS
BODY MASS INDEX: 32.32 KG/M2 | SYSTOLIC BLOOD PRESSURE: 128 MMHG | RESPIRATION RATE: 18 BRPM | WEIGHT: 194 LBS | OXYGEN SATURATION: 100 % | DIASTOLIC BLOOD PRESSURE: 67 MMHG | HEIGHT: 65 IN | TEMPERATURE: 98 F | HEART RATE: 84 BPM

## 2023-01-01 VITALS
WEIGHT: 192 LBS | OXYGEN SATURATION: 96 % | DIASTOLIC BLOOD PRESSURE: 79 MMHG | RESPIRATION RATE: 18 BRPM | TEMPERATURE: 98 F | HEART RATE: 89 BPM | HEIGHT: 65 IN | SYSTOLIC BLOOD PRESSURE: 118 MMHG | BODY MASS INDEX: 31.99 KG/M2

## 2023-01-01 VITALS
OXYGEN SATURATION: 98 % | RESPIRATION RATE: 18 BRPM | BODY MASS INDEX: 32.32 KG/M2 | HEART RATE: 104 BPM | WEIGHT: 194 LBS | HEIGHT: 65 IN | TEMPERATURE: 98 F | DIASTOLIC BLOOD PRESSURE: 87 MMHG | SYSTOLIC BLOOD PRESSURE: 125 MMHG

## 2023-01-01 VITALS
BODY MASS INDEX: 32.15 KG/M2 | DIASTOLIC BLOOD PRESSURE: 84 MMHG | RESPIRATION RATE: 19 BRPM | OXYGEN SATURATION: 97 % | HEART RATE: 74 BPM | HEIGHT: 65 IN | TEMPERATURE: 98 F | SYSTOLIC BLOOD PRESSURE: 124 MMHG | WEIGHT: 193 LBS

## 2023-01-01 DIAGNOSIS — Z86.59 HX OF ATTENTION DEFICIT DISORDER: ICD-10-CM

## 2023-01-01 DIAGNOSIS — R23.2 RED FACE: ICD-10-CM

## 2023-01-01 DIAGNOSIS — F98.8 ATTENTION DEFICIT DISORDER (ADD) WITHOUT HYPERACTIVITY: ICD-10-CM

## 2023-01-01 DIAGNOSIS — R50.9 FEVER, UNSPECIFIED FEVER CAUSE: ICD-10-CM

## 2023-01-01 DIAGNOSIS — J01.01 ACUTE RECURRENT MAXILLARY SINUSITIS: Primary | ICD-10-CM

## 2023-01-01 DIAGNOSIS — R68.2 DRY MOUTH AND EYES: ICD-10-CM

## 2023-01-01 DIAGNOSIS — K21.9 GASTROESOPHAGEAL REFLUX DISEASE WITHOUT ESOPHAGITIS: ICD-10-CM

## 2023-01-01 DIAGNOSIS — Z79.899 OTHER LONG TERM (CURRENT) DRUG THERAPY: Primary | ICD-10-CM

## 2023-01-01 DIAGNOSIS — I46.9 CARDIOPULMONARY ARREST: Primary | ICD-10-CM

## 2023-01-01 DIAGNOSIS — I10 PRIMARY HYPERTENSION: ICD-10-CM

## 2023-01-01 DIAGNOSIS — Z20.822 COUGH WITH EXPOSURE TO COVID-19 VIRUS: ICD-10-CM

## 2023-01-01 DIAGNOSIS — R10.10 PAIN OF UPPER ABDOMEN: ICD-10-CM

## 2023-01-01 DIAGNOSIS — Z86.59 HX OF ATTENTION DEFICIT DISORDER: Primary | ICD-10-CM

## 2023-01-01 DIAGNOSIS — F41.9 ANXIETY: ICD-10-CM

## 2023-01-01 DIAGNOSIS — R10.13 DYSPEPSIA: ICD-10-CM

## 2023-01-01 DIAGNOSIS — Z13.220 ENCOUNTER FOR SCREENING FOR LIPID DISORDER: ICD-10-CM

## 2023-01-01 DIAGNOSIS — Z79.899 ENCOUNTER FOR LONG-TERM (CURRENT) USE OF OTHER MEDICATIONS: ICD-10-CM

## 2023-01-01 DIAGNOSIS — Z78.9 NO IMMUNIZATION HISTORY RECORD: Primary | ICD-10-CM

## 2023-01-01 DIAGNOSIS — R05.8 COUGH WITH EXPOSURE TO COVID-19 VIRUS: ICD-10-CM

## 2023-01-01 DIAGNOSIS — F90.2 ATTENTION DEFICIT HYPERACTIVITY DISORDER (ADHD), COMBINED TYPE: ICD-10-CM

## 2023-01-01 DIAGNOSIS — J32.9 SINUSITIS, UNSPECIFIED CHRONICITY, UNSPECIFIED LOCATION: ICD-10-CM

## 2023-01-01 DIAGNOSIS — Z13.1 DIABETES MELLITUS SCREENING: ICD-10-CM

## 2023-01-01 DIAGNOSIS — H04.123 DRY MOUTH AND EYES: ICD-10-CM

## 2023-01-01 DIAGNOSIS — J30.9 ALLERGIC RHINITIS, UNSPECIFIED SEASONALITY, UNSPECIFIED TRIGGER: Primary | ICD-10-CM

## 2023-01-01 DIAGNOSIS — B34.9 VIRAL ILLNESS: Primary | ICD-10-CM

## 2023-01-01 DIAGNOSIS — Z79.899 ENCOUNTER FOR LONG-TERM (CURRENT) USE OF OTHER MEDICATIONS: Primary | ICD-10-CM

## 2023-01-01 LAB
CTP QC/QA: YES
EKG 12-LEAD: NORMAL
FLUAV AG NPH QL: NEGATIVE
FLUBV AG NPH QL: NEGATIVE
HBV SURFACE AB SER QL IA: NEGATIVE
HBV SURFACE AB SERPL IA-ACNC: <5 MIU/ML
MEASLES IGG INDEX: 1.86
MEV IGG SER QL: POSITIVE
MUMPS AB IGG INDEX: 3.74
MUV IGG SER QL IA: POSITIVE
POC (AMP) AMPHETAMINE: ABNORMAL
POC (BAR) BARBITURATES: NEGATIVE
POC (BUP) BUPRENORPHINE: NEGATIVE
POC (BZO) BENZODIAZEPINES: NEGATIVE
POC (COC) COCAINE: NEGATIVE
POC (MDMA) METHYLENEDIOXYMETHAMPHETAMINE 3,4: NEGATIVE
POC (MET) METHAMPHETAMINE: NEGATIVE
POC (MOP) OPIATES: NEGATIVE
POC (MTD) METHADONE: NEGATIVE
POC (OXY) OXYCODONE: NEGATIVE
POC (PCP) PHENCYCLIDINE: NEGATIVE
POC (TCA) TRICYCLIC ANTIDEPRESSANTS: ABNORMAL
POC TEMPERATURE (URINE): 92
POC TEMPERATURE (URINE): 92
POC TEMPERATURE (URINE): 94
POC THC: NEGATIVE
PR INTERVAL: NORMAL
PRT AXES: NORMAL
QRS DURATION: NORMAL
QT/QTC: NORMAL
RUBV IGG SER-ACNC: 88.6 IU/ML
SARS-COV-2 AG RESP QL IA.RAPID: NEGATIVE
VARICELLA ZOSTER, BLOOD: POSITIVE
VENTRICULAR RATE: NORMAL
VZV IGG INDEX: 2.14 (ref 0–0.9)

## 2023-01-01 PROCEDURE — 3008F BODY MASS INDEX DOCD: CPT | Mod: ,,,

## 2023-01-01 PROCEDURE — 3079F PR MOST RECENT DIASTOLIC BLOOD PRESSURE 80-89 MM HG: ICD-10-PCS | Mod: ,,, | Performed by: NURSE PRACTITIONER

## 2023-01-01 PROCEDURE — 96372 THER/PROPH/DIAG INJ SC/IM: CPT | Mod: ICN,,, | Performed by: NURSE PRACTITIONER

## 2023-01-01 PROCEDURE — 80305 POCT URINE DRUG SCREEN PRESUMP: ICD-10-PCS | Mod: QW,,, | Performed by: NURSE PRACTITIONER

## 2023-01-01 PROCEDURE — 1159F PR MEDICATION LIST DOCUMENTED IN MEDICAL RECORD: ICD-10-PCS | Mod: ,,, | Performed by: FAMILY MEDICINE

## 2023-01-01 PROCEDURE — 3080F PR MOST RECENT DIASTOLIC BLOOD PRESSURE >= 90 MM HG: ICD-10-PCS | Mod: ,,, | Performed by: FAMILY MEDICINE

## 2023-01-01 PROCEDURE — 99213 PR OFFICE/OUTPT VISIT, EST, LEVL III, 20-29 MIN: ICD-10-PCS | Mod: ,,, | Performed by: NURSE PRACTITIONER

## 2023-01-01 PROCEDURE — 3075F PR MOST RECENT SYSTOLIC BLOOD PRESS GE 130-139MM HG: ICD-10-PCS | Mod: ,,, | Performed by: NURSE PRACTITIONER

## 2023-01-01 PROCEDURE — 99213 OFFICE O/P EST LOW 20 MIN: CPT | Mod: ,,, | Performed by: NURSE PRACTITIONER

## 2023-01-01 PROCEDURE — 80305 DRUG TEST PRSMV DIR OPT OBS: CPT | Mod: QW,,, | Performed by: FAMILY MEDICINE

## 2023-01-01 PROCEDURE — 3008F BODY MASS INDEX DOCD: CPT | Mod: ,,, | Performed by: NURSE PRACTITIONER

## 2023-01-01 PROCEDURE — 3008F PR BODY MASS INDEX (BMI) DOCUMENTED: ICD-10-PCS | Mod: ,,, | Performed by: NURSE PRACTITIONER

## 2023-01-01 PROCEDURE — 1159F PR MEDICATION LIST DOCUMENTED IN MEDICAL RECORD: ICD-10-PCS | Mod: ,,, | Performed by: NURSE PRACTITIONER

## 2023-01-01 PROCEDURE — 3074F SYST BP LT 130 MM HG: CPT | Mod: ,,, | Performed by: NURSE PRACTITIONER

## 2023-01-01 PROCEDURE — 1160F RVW MEDS BY RX/DR IN RCRD: CPT | Mod: ,,, | Performed by: FAMILY MEDICINE

## 2023-01-01 PROCEDURE — 1159F MED LIST DOCD IN RCRD: CPT | Mod: ,,, | Performed by: NURSE PRACTITIONER

## 2023-01-01 PROCEDURE — 86706 HEPATITIS B SURFACE ANTIBODY, QUAL/QUANT: ICD-10-PCS | Mod: 90,,, | Performed by: CLINICAL MEDICAL LABORATORY

## 2023-01-01 PROCEDURE — 99395 PR PREVENTIVE VISIT,EST,18-39: ICD-10-PCS | Mod: ,,, | Performed by: FAMILY MEDICINE

## 2023-01-01 PROCEDURE — 3074F PR MOST RECENT SYSTOLIC BLOOD PRESSURE < 130 MM HG: ICD-10-PCS | Mod: ,,, | Performed by: FAMILY MEDICINE

## 2023-01-01 PROCEDURE — 3008F PR BODY MASS INDEX (BMI) DOCUMENTED: ICD-10-PCS | Mod: ,,,

## 2023-01-01 PROCEDURE — 99214 OFFICE O/P EST MOD 30 MIN: CPT | Mod: 25,ICN,, | Performed by: NURSE PRACTITIONER

## 2023-01-01 PROCEDURE — 4010F ACE/ARB THERAPY RXD/TAKEN: CPT | Mod: ,,, | Performed by: NURSE PRACTITIONER

## 2023-01-01 PROCEDURE — 1159F MED LIST DOCD IN RCRD: CPT | Mod: ICN,,, | Performed by: NURSE PRACTITIONER

## 2023-01-01 PROCEDURE — 3078F PR MOST RECENT DIASTOLIC BLOOD PRESSURE < 80 MM HG: ICD-10-PCS | Mod: ICN,,, | Performed by: NURSE PRACTITIONER

## 2023-01-01 PROCEDURE — 1160F PR REVIEW ALL MEDS BY PRESCRIBER/CLIN PHARMACIST DOCUMENTED: ICD-10-PCS | Mod: ,,, | Performed by: NURSE PRACTITIONER

## 2023-01-01 PROCEDURE — 99395 PREV VISIT EST AGE 18-39: CPT | Mod: ,,, | Performed by: FAMILY MEDICINE

## 2023-01-01 PROCEDURE — 92950 HEART/LUNG RESUSCITATION CPR: CPT

## 2023-01-01 PROCEDURE — 93000 ELECTROCARDIOGRAM COMPLETE: CPT | Mod: ,,, | Performed by: NURSE PRACTITIONER

## 2023-01-01 PROCEDURE — 1159F PR MEDICATION LIST DOCUMENTED IN MEDICAL RECORD: ICD-10-PCS | Mod: ICN,,, | Performed by: NURSE PRACTITIONER

## 2023-01-01 PROCEDURE — 4010F ACE/ARB THERAPY RXD/TAKEN: CPT | Mod: ICN,,, | Performed by: NURSE PRACTITIONER

## 2023-01-01 PROCEDURE — 96372 PR INJECTION,THERAP/PROPH/DIAG2ST, IM OR SUBCUT: ICD-10-PCS | Mod: ICN,,, | Performed by: NURSE PRACTITIONER

## 2023-01-01 PROCEDURE — 87804 INFLUENZA ASSAY W/OPTIC: CPT | Mod: QW,,, | Performed by: NURSE PRACTITIONER

## 2023-01-01 PROCEDURE — 4010F PR ACE/ARB THEARPY RXD/TAKEN: ICD-10-PCS | Mod: ,,, | Performed by: NURSE PRACTITIONER

## 2023-01-01 PROCEDURE — 4010F PR ACE/ARB THEARPY RXD/TAKEN: ICD-10-PCS | Mod: ICN,,, | Performed by: NURSE PRACTITIONER

## 2023-01-01 PROCEDURE — 1159F MED LIST DOCD IN RCRD: CPT | Mod: ,,,

## 2023-01-01 PROCEDURE — 3008F PR BODY MASS INDEX (BMI) DOCUMENTED: ICD-10-PCS | Mod: ICN,,, | Performed by: NURSE PRACTITIONER

## 2023-01-01 PROCEDURE — 99285 EMERGENCY DEPT VISIT HI MDM: CPT | Mod: ,,, | Performed by: REGISTERED NURSE

## 2023-01-01 PROCEDURE — 4010F PR ACE/ARB THEARPY RXD/TAKEN: ICD-10-PCS | Mod: ,,, | Performed by: FAMILY MEDICINE

## 2023-01-01 PROCEDURE — 3078F PR MOST RECENT DIASTOLIC BLOOD PRESSURE < 80 MM HG: ICD-10-PCS | Mod: ,,, | Performed by: FAMILY MEDICINE

## 2023-01-01 PROCEDURE — 3074F PR MOST RECENT SYSTOLIC BLOOD PRESSURE < 130 MM HG: ICD-10-PCS | Mod: ,,,

## 2023-01-01 PROCEDURE — 93000 POCT EKG 12-LEAD: ICD-10-PCS | Mod: ,,, | Performed by: NURSE PRACTITIONER

## 2023-01-01 PROCEDURE — 87804 POCT INFLUENZA A/B: ICD-10-PCS | Mod: QW,,, | Performed by: NURSE PRACTITIONER

## 2023-01-01 PROCEDURE — 1159F MED LIST DOCD IN RCRD: CPT | Mod: ,,, | Performed by: FAMILY MEDICINE

## 2023-01-01 PROCEDURE — 87426 SARS CORONAVIRUS 2 ANTIGEN POCT: ICD-10-PCS | Mod: QW,,, | Performed by: NURSE PRACTITIONER

## 2023-01-01 PROCEDURE — 99285 PR EMERGENCY DEPT VISIT,LEVEL V: ICD-10-PCS | Mod: ,,, | Performed by: REGISTERED NURSE

## 2023-01-01 PROCEDURE — 99284 EMERGENCY DEPT VISIT MOD MDM: CPT | Mod: 25

## 2023-01-01 PROCEDURE — 3078F DIAST BP <80 MM HG: CPT | Mod: ICN,,, | Performed by: NURSE PRACTITIONER

## 2023-01-01 PROCEDURE — 3008F PR BODY MASS INDEX (BMI) DOCUMENTED: ICD-10-PCS | Mod: ,,, | Performed by: FAMILY MEDICINE

## 2023-01-01 PROCEDURE — 3074F SYST BP LT 130 MM HG: CPT | Mod: ,,, | Performed by: FAMILY MEDICINE

## 2023-01-01 PROCEDURE — 3080F PR MOST RECENT DIASTOLIC BLOOD PRESSURE >= 90 MM HG: ICD-10-PCS | Mod: ,,, | Performed by: NURSE PRACTITIONER

## 2023-01-01 PROCEDURE — 3079F DIAST BP 80-89 MM HG: CPT | Mod: ,,, | Performed by: NURSE PRACTITIONER

## 2023-01-01 PROCEDURE — 3080F DIAST BP >= 90 MM HG: CPT | Mod: ,,, | Performed by: NURSE PRACTITIONER

## 2023-01-01 PROCEDURE — 99213 OFFICE O/P EST LOW 20 MIN: CPT | Mod: 25,ICN,, | Performed by: NURSE PRACTITIONER

## 2023-01-01 PROCEDURE — 86762 RUBELLA ANTIBODY: CPT | Mod: ,,, | Performed by: CLINICAL MEDICAL LABORATORY

## 2023-01-01 PROCEDURE — 80305 DRUG TEST PRSMV DIR OPT OBS: CPT | Mod: QW,,, | Performed by: NURSE PRACTITIONER

## 2023-01-01 PROCEDURE — 4010F ACE/ARB THERAPY RXD/TAKEN: CPT | Mod: ,,,

## 2023-01-01 PROCEDURE — 3074F PR MOST RECENT SYSTOLIC BLOOD PRESSURE < 130 MM HG: ICD-10-PCS | Mod: ,,, | Performed by: NURSE PRACTITIONER

## 2023-01-01 PROCEDURE — 63600175 PHARM REV CODE 636 W HCPCS: Performed by: REGISTERED NURSE

## 2023-01-01 PROCEDURE — 99213 OFFICE O/P EST LOW 20 MIN: CPT | Mod: ,,,

## 2023-01-01 PROCEDURE — 1160F RVW MEDS BY RX/DR IN RCRD: CPT | Mod: ,,, | Performed by: NURSE PRACTITIONER

## 2023-01-01 PROCEDURE — 86735 MUMPS ANTIBODY: CPT | Mod: ,,, | Performed by: CLINICAL MEDICAL LABORATORY

## 2023-01-01 PROCEDURE — 3074F PR MOST RECENT SYSTOLIC BLOOD PRESSURE < 130 MM HG: ICD-10-PCS | Mod: ICN,,, | Performed by: NURSE PRACTITIONER

## 2023-01-01 PROCEDURE — 3008F BODY MASS INDEX DOCD: CPT | Mod: ,,, | Performed by: FAMILY MEDICINE

## 2023-01-01 PROCEDURE — 86787 VARICELLA-ZOSTER ANTIBODY: CPT | Mod: ,,, | Performed by: CLINICAL MEDICAL LABORATORY

## 2023-01-01 PROCEDURE — 86735 MUMPS, IGG SCREEN: ICD-10-PCS | Mod: ,,, | Performed by: CLINICAL MEDICAL LABORATORY

## 2023-01-01 PROCEDURE — 1160F PR REVIEW ALL MEDS BY PRESCRIBER/CLIN PHARMACIST DOCUMENTED: ICD-10-PCS | Mod: ICN,,, | Performed by: NURSE PRACTITIONER

## 2023-01-01 PROCEDURE — 99213 PR OFFICE/OUTPT VISIT, EST, LEVL III, 20-29 MIN: ICD-10-PCS | Mod: ,,,

## 2023-01-01 PROCEDURE — 1159F PR MEDICATION LIST DOCUMENTED IN MEDICAL RECORD: ICD-10-PCS | Mod: ,,,

## 2023-01-01 PROCEDURE — 27000221 HC OXYGEN, UP TO 24 HOURS

## 2023-01-01 PROCEDURE — 3008F BODY MASS INDEX DOCD: CPT | Mod: ICN,,, | Performed by: NURSE PRACTITIONER

## 2023-01-01 PROCEDURE — 86765 RUBEOLA ANTIBODY: CPT | Mod: ,,, | Performed by: CLINICAL MEDICAL LABORATORY

## 2023-01-01 PROCEDURE — 86765 RUBEOLA ANTIBODY IGG: ICD-10-PCS | Mod: ,,, | Performed by: CLINICAL MEDICAL LABORATORY

## 2023-01-01 PROCEDURE — 86787 VARICELLA ZOSTER ANTIBODY, IGG: ICD-10-PCS | Mod: ,,, | Performed by: CLINICAL MEDICAL LABORATORY

## 2023-01-01 PROCEDURE — 1160F PR REVIEW ALL MEDS BY PRESCRIBER/CLIN PHARMACIST DOCUMENTED: ICD-10-PCS | Mod: ,,,

## 2023-01-01 PROCEDURE — 4010F PR ACE/ARB THEARPY RXD/TAKEN: ICD-10-PCS | Mod: ,,,

## 2023-01-01 PROCEDURE — 31500 INSERT EMERGENCY AIRWAY: CPT

## 2023-01-01 PROCEDURE — 4010F ACE/ARB THERAPY RXD/TAKEN: CPT | Mod: ,,, | Performed by: FAMILY MEDICINE

## 2023-01-01 PROCEDURE — 3079F DIAST BP 80-89 MM HG: CPT | Mod: ,,,

## 2023-01-01 PROCEDURE — 87426 SARSCOV CORONAVIRUS AG IA: CPT | Mod: QW,,, | Performed by: NURSE PRACTITIONER

## 2023-01-01 PROCEDURE — 3074F SYST BP LT 130 MM HG: CPT | Mod: ,,,

## 2023-01-01 PROCEDURE — 3074F SYST BP LT 130 MM HG: CPT | Mod: ICN,,, | Performed by: NURSE PRACTITIONER

## 2023-01-01 PROCEDURE — 80305 POCT URINE DRUG SCREEN PRESUMP: ICD-10-PCS | Mod: QW,,, | Performed by: FAMILY MEDICINE

## 2023-01-01 PROCEDURE — 1160F PR REVIEW ALL MEDS BY PRESCRIBER/CLIN PHARMACIST DOCUMENTED: ICD-10-PCS | Mod: ,,, | Performed by: FAMILY MEDICINE

## 2023-01-01 PROCEDURE — 99214 OFFICE O/P EST MOD 30 MIN: CPT | Mod: ,,, | Performed by: FAMILY MEDICINE

## 2023-01-01 PROCEDURE — 3078F DIAST BP <80 MM HG: CPT | Mod: ,,, | Performed by: FAMILY MEDICINE

## 2023-01-01 PROCEDURE — 1160F RVW MEDS BY RX/DR IN RCRD: CPT | Mod: ICN,,, | Performed by: NURSE PRACTITIONER

## 2023-01-01 PROCEDURE — 3080F DIAST BP >= 90 MM HG: CPT | Mod: ,,, | Performed by: FAMILY MEDICINE

## 2023-01-01 PROCEDURE — 99213 PR OFFICE/OUTPT VISIT, EST, LEVL III, 20-29 MIN: ICD-10-PCS | Mod: 25,ICN,, | Performed by: NURSE PRACTITIONER

## 2023-01-01 PROCEDURE — 3079F PR MOST RECENT DIASTOLIC BLOOD PRESSURE 80-89 MM HG: ICD-10-PCS | Mod: ,,,

## 2023-01-01 PROCEDURE — 3075F SYST BP GE 130 - 139MM HG: CPT | Mod: ,,, | Performed by: NURSE PRACTITIONER

## 2023-01-01 PROCEDURE — 1160F RVW MEDS BY RX/DR IN RCRD: CPT | Mod: ,,,

## 2023-01-01 PROCEDURE — 86762 RUBELLA ANTIBODY, IGG: ICD-10-PCS | Mod: ,,, | Performed by: CLINICAL MEDICAL LABORATORY

## 2023-01-01 PROCEDURE — 99214 PR OFFICE/OUTPT VISIT, EST, LEVL IV, 30-39 MIN: ICD-10-PCS | Mod: ,,, | Performed by: FAMILY MEDICINE

## 2023-01-01 PROCEDURE — 99214 PR OFFICE/OUTPT VISIT, EST, LEVL IV, 30-39 MIN: ICD-10-PCS | Mod: 25,ICN,, | Performed by: NURSE PRACTITIONER

## 2023-01-01 PROCEDURE — 86706 HEP B SURFACE ANTIBODY: CPT | Mod: 90,,, | Performed by: CLINICAL MEDICAL LABORATORY

## 2023-01-01 RX ORDER — DEXTROAMPHETAMINE SACCHARATE, AMPHETAMINE ASPARTATE, DEXTROAMPHETAMINE SULFATE AND AMPHETAMINE SULFATE 5; 5; 5; 5 MG/1; MG/1; MG/1; MG/1
1 TABLET ORAL DAILY
Qty: 30 TABLET | Refills: 0 | Status: SHIPPED | OUTPATIENT
Start: 2023-01-01 | End: 2023-01-01

## 2023-01-01 RX ORDER — DEXTROAMPHETAMINE SACCHARATE, AMPHETAMINE ASPARTATE MONOHYDRATE, DEXTROAMPHETAMINE SULFATE AND AMPHETAMINE SULFATE 5; 5; 5; 5 MG/1; MG/1; MG/1; MG/1
20 CAPSULE, EXTENDED RELEASE ORAL EVERY MORNING
Qty: 30 CAPSULE | Refills: 0 | Status: SHIPPED | OUTPATIENT
Start: 2023-01-01 | End: 2023-01-01

## 2023-01-01 RX ORDER — DEXAMETHASONE SODIUM PHOSPHATE 4 MG/ML
4 INJECTION, SOLUTION INTRA-ARTICULAR; INTRALESIONAL; INTRAMUSCULAR; INTRAVENOUS; SOFT TISSUE
Status: COMPLETED | OUTPATIENT
Start: 2023-01-01 | End: 2023-01-01

## 2023-01-01 RX ORDER — DEXTROAMPHETAMINE SULFATE, DEXTROAMPHETAMINE SACCHARATE, AMPHETAMINE ASPARTATE MONOHYDRATE, AND AMPHETAMINE SULFATE 12.5; 12.5; 12.5; 12.5 MG/1; MG/1; MG/1; MG/1
1 CAPSULE, EXTENDED RELEASE ORAL DAILY
Qty: 30 EACH | Refills: 0 | Status: SHIPPED | OUTPATIENT
Start: 2023-01-01 | End: 2023-01-01

## 2023-01-01 RX ORDER — CITALOPRAM 20 MG/1
20 TABLET, FILM COATED ORAL DAILY
Qty: 30 TABLET | Refills: 11 | Status: SHIPPED | OUTPATIENT
Start: 2023-01-01 | End: 2023-01-01 | Stop reason: SDUPTHER

## 2023-01-01 RX ORDER — DEXTROAMPHETAMINE SACCHARATE, AMPHETAMINE ASPARTATE MONOHYDRATE, DEXTROAMPHETAMINE SULFATE, AMPHETAMINE SULFATE 12.5; 12.5; 12.5; 12.5 MG/1; MG/1; MG/1; MG/1
1 CAPSULE, EXTENDED RELEASE ORAL DAILY
Qty: 30 EACH | Refills: 0 | Status: SHIPPED | OUTPATIENT
Start: 2023-01-01

## 2023-01-01 RX ORDER — SUCRALFATE 1 G/1
1 TABLET ORAL 2 TIMES DAILY
Qty: 180 TABLET | Refills: 1 | Status: SHIPPED | OUTPATIENT
Start: 2023-01-01

## 2023-01-01 RX ORDER — EPINEPHRINE 0.1 MG/ML
INJECTION INTRAVENOUS CODE/TRAUMA/SEDATION MEDICATION
Status: COMPLETED | OUTPATIENT
Start: 2023-01-01 | End: 2023-01-01

## 2023-01-01 RX ORDER — TELMISARTAN 40 MG/1
40 TABLET ORAL DAILY
Qty: 90 TABLET | Refills: 1 | Status: SHIPPED | OUTPATIENT
Start: 2023-01-01 | End: 2023-01-01 | Stop reason: SDUPTHER

## 2023-01-01 RX ORDER — DEXTROAMPHETAMINE SACCHARATE, AMPHETAMINE ASPARTATE MONOHYDRATE, DEXTROAMPHETAMINE SULFATE AND AMPHETAMINE SULFATE 7.5; 7.5; 7.5; 7.5 MG/1; MG/1; MG/1; MG/1
30 CAPSULE, EXTENDED RELEASE ORAL EVERY MORNING
Qty: 30 CAPSULE | Refills: 0 | Status: SHIPPED | OUTPATIENT
Start: 2023-01-01 | End: 2023-01-01

## 2023-01-01 RX ORDER — OMEPRAZOLE 40 MG/1
40 CAPSULE, DELAYED RELEASE ORAL 2 TIMES DAILY
Qty: 90 CAPSULE | Refills: 1 | Status: SHIPPED | OUTPATIENT
Start: 2023-01-01 | End: 2023-01-01 | Stop reason: SDUPTHER

## 2023-01-01 RX ORDER — DEXTROAMPHETAMINE SACCHARATE, AMPHETAMINE ASPARTATE MONOHYDRATE, DEXTROAMPHETAMINE SULFATE AND AMPHETAMINE SULFATE 2.5; 2.5; 2.5; 2.5 MG/1; MG/1; MG/1; MG/1
10 CAPSULE, EXTENDED RELEASE ORAL EVERY MORNING
Qty: 30 CAPSULE | Refills: 0 | Status: SHIPPED | OUTPATIENT
Start: 2023-01-01 | End: 2023-01-01

## 2023-01-01 RX ORDER — DEXTROAMPHETAMINE SULFATE, DEXTROAMPHETAMINE SACCHARATE, AMPHETAMINE ASPARTATE MONOHYDRATE, AND AMPHETAMINE SULFATE 12.5; 12.5; 12.5; 12.5 MG/1; MG/1; MG/1; MG/1
1 CAPSULE, EXTENDED RELEASE ORAL DAILY
Qty: 30 EACH | Refills: 0 | Status: SHIPPED | OUTPATIENT
Start: 2023-01-01 | End: 2023-01-01 | Stop reason: SDUPTHER

## 2023-01-01 RX ORDER — OMEPRAZOLE 40 MG/1
40 CAPSULE, DELAYED RELEASE ORAL 2 TIMES DAILY
Qty: 90 CAPSULE | Refills: 1 | Status: SHIPPED | OUTPATIENT
Start: 2023-01-01 | End: 2023-01-01

## 2023-01-01 RX ORDER — EPINEPHRINE 0.1 MG/ML
1 INJECTION INTRAVENOUS
Status: DISCONTINUED | OUTPATIENT
Start: 2023-01-01 | End: 2023-01-01

## 2023-01-01 RX ORDER — CHLORPHENIRAMINE MALEATE AND PHENYLEPHRINE HYDROCHLORIDE 4; 10 MG/1; MG/1
1 TABLET, COATED ORAL EVERY 6 HOURS PRN
Qty: 30 TABLET | Refills: 0 | Status: SHIPPED | OUTPATIENT
Start: 2023-01-01 | End: 2023-01-01

## 2023-01-01 RX ORDER — DOXYCYCLINE 100 MG/1
100 CAPSULE ORAL 2 TIMES DAILY
Qty: 20 CAPSULE | Refills: 0 | Status: SHIPPED | OUTPATIENT
Start: 2023-01-01 | End: 2023-01-01

## 2023-01-01 RX ORDER — BENZOCAINE .13; .15; .5; 2 G/100G; G/100G; G/100G; G/100G
2 GEL ORAL DAILY
Qty: 8.43 ML | Refills: 2 | Status: SHIPPED | OUTPATIENT
Start: 2023-01-01 | End: 2023-01-01

## 2023-01-01 RX ORDER — CEFTRIAXONE 1 G/1
1 INJECTION, POWDER, FOR SOLUTION INTRAMUSCULAR; INTRAVENOUS
Status: COMPLETED | OUTPATIENT
Start: 2023-01-01 | End: 2023-01-01

## 2023-01-01 RX ORDER — DEXTROAMPHETAMINE SACCHARATE, AMPHETAMINE ASPARTATE, DEXTROAMPHETAMINE SULFATE AND AMPHETAMINE SULFATE 5; 5; 5; 5 MG/1; MG/1; MG/1; MG/1
1 TABLET ORAL DAILY
Qty: 30 TABLET | Refills: 0 | Status: SHIPPED | OUTPATIENT
Start: 2023-01-01 | End: 2023-01-01 | Stop reason: RX

## 2023-01-01 RX ORDER — TELMISARTAN 40 MG/1
40 TABLET ORAL DAILY
Qty: 90 TABLET | Refills: 1 | Status: SHIPPED | OUTPATIENT
Start: 2023-01-01

## 2023-01-01 RX ORDER — OMEPRAZOLE 40 MG/1
40 CAPSULE, DELAYED RELEASE ORAL 2 TIMES DAILY
Qty: 90 CAPSULE | Refills: 1 | Status: SHIPPED | OUTPATIENT
Start: 2023-01-01

## 2023-01-01 RX ORDER — TELMISARTAN 40 MG/1
40 TABLET ORAL DAILY
Qty: 90 TABLET | Refills: 0 | Status: SHIPPED | OUTPATIENT
Start: 2023-01-01 | End: 2023-01-01 | Stop reason: SDUPTHER

## 2023-01-01 RX ORDER — CITALOPRAM 20 MG/1
20 TABLET, FILM COATED ORAL DAILY
Qty: 90 TABLET | Refills: 1 | Status: SHIPPED | OUTPATIENT
Start: 2023-01-01 | End: 2024-03-16

## 2023-01-01 RX ORDER — METHYLPREDNISOLONE ACETATE 40 MG/ML
40 INJECTION, SUSPENSION INTRA-ARTICULAR; INTRALESIONAL; INTRAMUSCULAR; SOFT TISSUE
Status: COMPLETED | OUTPATIENT
Start: 2023-01-01 | End: 2023-01-01

## 2023-01-01 RX ORDER — AMOXICILLIN AND CLAVULANATE POTASSIUM 500; 125 MG/1; MG/1
1 TABLET, FILM COATED ORAL 2 TIMES DAILY
Qty: 20 TABLET | Refills: 0 | Status: SHIPPED | OUTPATIENT
Start: 2023-01-01 | End: 2023-01-01

## 2023-01-01 RX ADMIN — CEFTRIAXONE 1 G: 1 INJECTION, POWDER, FOR SOLUTION INTRAMUSCULAR; INTRAVENOUS at 08:05

## 2023-01-01 RX ADMIN — DEXAMETHASONE SODIUM PHOSPHATE 4 MG: 4 INJECTION, SOLUTION INTRA-ARTICULAR; INTRALESIONAL; INTRAMUSCULAR; INTRAVENOUS; SOFT TISSUE at 10:08

## 2023-01-01 RX ADMIN — EPINEPHRINE 1 MG: 0.1 INJECTION, SOLUTION ENDOTRACHEAL; INTRACARDIAC; INTRAVENOUS at 07:12

## 2023-01-01 RX ADMIN — METHYLPREDNISOLONE ACETATE 40 MG: 40 INJECTION, SUSPENSION INTRA-ARTICULAR; INTRALESIONAL; INTRAMUSCULAR; SOFT TISSUE at 10:08

## 2023-01-17 NOTE — PROGRESS NOTES
Subjective:  Earline Lemus is an 37 y.o. female who presents  for Healthy You (Here for healthy you) and ADHD (Needs refills on ADHD medicine)      History obtained from patient    HPI:  Earline is currently takkin adderral and is doing well with it, has been having difficulties getting it due to back orders.     The medication wears off around 6 PM   Currently, the medicine seems to be working well.   Side effects include not at this time  Eating wells  Sleeping well    Review of Systems   Constitutional:  Negative for activity change and fatigue.   Respiratory:  Negative for shortness of breath.    Cardiovascular:  Negative for chest pain, palpitations and leg swelling.   Gastrointestinal:  Negative for change in bowel habit, constipation and change in bowel habit.   Endocrine: Negative for polydipsia, polyphagia and polyuria.   Musculoskeletal:  Negative for gait problem, neck pain and neck stiffness.   Integumentary:  Negative for rash.   Psychiatric/Behavioral:  Positive for decreased concentration. Negative for agitation, behavioral problems, sleep disturbance and suicidal ideas. The patient is nervous/anxious and is hyperactive.        Patient Active Problem List   Diagnosis    Anxiety    Red face    Vitamin D deficiency    Hair loss    Sleep disorder    Hx of attention deficit disorder    Hypertension    Exposure to COVID-19 virus    Cough    Allergic rhinitis    Dehydration    Closed nondisplaced fracture of fifth left metatarsal bone    Left foot pain        Current Outpatient Medications   Medication Sig Dispense Refill    sucralfate (CARAFATE) 1 gram tablet Take 1 tablet (1 g total) by mouth 2 (two) times daily. 60 tablet 0    citalopram (CELEXA) 20 MG tablet Take 1 tablet (20 mg total) by mouth once daily. 30 tablet 11    [START ON 3/18/2023] dextroamphetamine-amphetamine (ADDERALL XR) 30 MG 24 hr capsule Take 1 capsule (30 mg total) by mouth every morning. 30 capsule 0     "dextroamphetamine-amphetamine (ADDERALL XR) 30 MG 24 hr capsule Take 1 capsule (30 mg total) by mouth every morning. 30 capsule 0    [START ON 2/16/2023] dextroamphetamine-amphetamine (ADDERALL XR) 30 MG 24 hr capsule Take 1 capsule (30 mg total) by mouth every morning. 30 capsule 0    [START ON 3/18/2023] dextroamphetamine-amphetamine (ADDERALL) 20 mg tablet Take 1 tablet by mouth once daily. 30 tablet 0    [START ON 2/16/2023] dextroamphetamine-amphetamine (ADDERALL) 20 mg tablet Take 1 tablet by mouth once daily. 30 tablet 0    dextroamphetamine-amphetamine (ADDERALL) 20 mg tablet Take 1 tablet by mouth once daily. 30 tablet 0    omeprazole (PRILOSEC) 40 MG capsule Take 1 capsule (40 mg total) by mouth 2 (two) times daily. 90 capsule 1    telmisartan (MICARDIS) 40 MG Tab Take 1 tablet (40 mg total) by mouth once daily. 90 tablet 0     No current facility-administered medications for this visit.       Physical Exam:     Blood pressure (!) 120/90, pulse 99, temperature 97.9 °F (36.6 °C), temperature source Oral, resp. rate 18, height 5' 5" (1.651 m), weight 86.2 kg (190 lb), last menstrual period 01/09/2023, SpO2 99 %. Growth percentile Smart[employer] can only be used for patients less than 20 years old.     Physical Exam  Constitutional:       Appearance: Normal appearance. She is obese. She is not ill-appearing.   Cardiovascular:      Rate and Rhythm: Normal rate and regular rhythm.   Pulmonary:      Effort: Pulmonary effort is normal.      Breath sounds: Normal breath sounds.   Abdominal:      General: Abdomen is flat. Bowel sounds are normal.   Neurological:      Mental Status: She is alert and oriented to person, place, and time.   Psychiatric:         Mood and Affect: Mood normal.         Behavior: Behavior normal.         Judgment: Judgment normal.         Assessment:  Earline was seen today for healthy you and adhd.    Diagnoses and all orders for this visit:    Other long term (current) drug therapy  -     " POCT Urine Drug Screen Presump    Attention deficit disorder (ADD) without hyperactivity  -     dextroamphetamine-amphetamine (ADDERALL XR) 30 MG 24 hr capsule; Take 1 capsule (30 mg total) by mouth every morning.  -     dextroamphetamine-amphetamine (ADDERALL XR) 30 MG 24 hr capsule; Take 1 capsule (30 mg total) by mouth every morning.  -     dextroamphetamine-amphetamine (ADDERALL XR) 30 MG 24 hr capsule; Take 1 capsule (30 mg total) by mouth every morning.  -     dextroamphetamine-amphetamine (ADDERALL) 20 mg tablet; Take 1 tablet by mouth once daily.  -     dextroamphetamine-amphetamine (ADDERALL) 20 mg tablet; Take 1 tablet by mouth once daily.  -     dextroamphetamine-amphetamine (ADDERALL) 20 mg tablet; Take 1 tablet by mouth once daily.    Primary hypertension  -     telmisartan (MICARDIS) 40 MG Tab; Take 1 tablet (40 mg total) by mouth once daily.    Gastroesophageal reflux disease without esophagitis  -     omeprazole (PRILOSEC) 40 MG capsule; Take 1 capsule (40 mg total) by mouth 2 (two) times daily.    Anxiety  -     citalopram (CELEXA) 20 MG tablet; Take 1 tablet (20 mg total) by mouth once daily.    Diabetes mellitus screening  -     Glucose, Fasting; Future    Encounter for screening for lipid disorder  -     Lipid Panel; Future         Plan:    MS  report reviewed.   Discussed need for adequate sleep with early and routine bedtime.   Encourage low sugar diet. Encourage high protein breakfast before taking medication.   Call if medicine needs adjustment.   Next med check in 3 months or sooner if needed.   Keep yearly well check as scheduled.       she is going to try to improve healthy choices, reduce calorie intake, try to do more physical activity to lose weight. Recommended at least 150 minutes a week with resistance training as tolerated  Monitor weight  attend regularly scheduled apts   Schedule yearly eye exam  Take medications as prescirved  Improve nutrition, decrease car intake, decrease  fast food  Stay away from tobacco products  Sun screen recommended and discuseed    Beth Lundy MD

## 2023-02-20 NOTE — TELEPHONE ENCOUNTER
Patient states that [facility] is out of the Adderal 30mg. [facility] have the 20mg and 10mg XR, but she'll need a rx sent in. What do she need to do?

## 2023-03-01 NOTE — PROGRESS NOTES
CARLOS Santos   [facility]  [address]  [place] [address]     PATIENT NAME: Earline Lemus  : 1985  DATE: 3/1/23  MRN: [id number]      Billing Provider: CARLOS Santos  Level of Service:   Patient PCP Information       Provider PCP Type    Beth Lundy MD General            Reason for Visit / Chief Complaint: COVID-19 Concerns (Started feeling feverish, generalized achy, home test was positive)       Update PCP  Update Chief Complaint         History of Present Illness / Problem Focused Workflow     Earline Lemus presents to the clinic yesterday felt bad some aches. Today having feverish feeling dull headache eyes heavy does not feel well. Daughter also with similar symptoms    Hemoglobin A1C   Date Value Ref Range Status   2022 5.3 4.5 - 6.6 % Final     Comment:       Normal:               <5.7%  Pre-Diabetic:       5.7% to 6.4%  Diabetic:             >6.4%  Diabetic Goal:     <7%   2021 5.3 4.5 - 6.6 % Final     Comment:       Normal:               <5.7%  Pre-Diabetic:       5.7% to 6.4%  Diabetic:             >6.4%  Diabetic Goal:     <7%        CMP  Sodium   Date Value Ref Range Status   2022 134 (L) 136 - 145 mmol/L Final     Potassium   Date Value Ref Range Status   2022 4.2 3.5 - 5.1 mmol/L Final     Chloride   Date Value Ref Range Status   2022 99 98 - 107 mmol/L Final     CO2   Date Value Ref Range Status   2022 29 21 - 32 mmol/L Final     Glucose   Date Value Ref Range Status   2022 112 (H) 74 - 106 mg/dL Final     BUN   Date Value Ref Range Status   2022 14 7 - 18 mg/dL Final     Creatinine   Date Value Ref Range Status   2022 0.90 0.55 - 1.02 mg/dL Final     Calcium   Date Value Ref Range Status   2022 9.8 8.5 - 10.1 mg/dL Final     Total Protein   Date Value Ref Range Status   2022 8.1 6.4 - 8.2 g/dL Final     Albumin   Date Value Ref Range Status   2022 3.8 3.5 - 5.0  g/dL Final     Bilirubin, Total   Date Value Ref Range Status   07/29/2022 0.3 0.0 - 1.2 mg/dL Final     Alk Phos   Date Value Ref Range Status   07/29/2022 89 37 - 98 U/L Final     AST   Date Value Ref Range Status   07/29/2022 22 15 - 37 U/L Final     ALT   Date Value Ref Range Status   07/29/2022 30 13 - 56 U/L Final     Anion Gap   Date Value Ref Range Status   07/29/2022 10 7 - 16 mmol/L Final        Lab Results   Component Value Date    WBC 11.05 (H) 07/29/2022    RBC 5.46 (H) 07/29/2022    HGB 14.9 07/29/2022    HCT 46.4 07/29/2022    MCV 85.0 07/29/2022    MCH 27.3 07/29/2022    MCHC 32.1 07/29/2022    RDW 14.7 (H) 07/29/2022     (H) 07/29/2022    MPV 9.9 07/29/2022    LYMPH 19.6 (L) 07/29/2022    LYMPH 2.17 07/29/2022    MONO 9.4 (H) 07/29/2022    EOS 0.16 07/29/2022    BASO 0.02 07/29/2022    EOSINOPHIL 1.4 07/29/2022    BASOPHIL 0.2 07/29/2022        Lab Results   Component Value Date    CHOL 200 07/29/2022    CHOL 214 (H) 06/30/2021     Lab Results   Component Value Date    HDL 38 (L) 07/29/2022    HDL 37 (L) 06/30/2021     Lab Results   Component Value Date    LDLCALC 138 07/29/2022    LDLCALC 163 06/30/2021     Lab Results   Component Value Date    TRIG 122 07/29/2022    TRIG 69 06/30/2021     Lab Results   Component Value Date    CHOLHDL 5.3 07/29/2022    CHOLHDL 5.8 06/30/2021        Wt Readings from Last 3 Encounters:   03/01/23 1618 85.3 kg (188 lb)   01/17/23 1631 86.2 kg (190 lb)   11/15/22 1515 82.6 kg (182 lb)        BP Readings from Last 3 Encounters:   03/01/23 118/80   01/17/23 (!) 120/90   11/15/22 134/76        Review of Systems     Review of Systems   Constitutional:  Positive for chills, fatigue and fever.   HENT:  Positive for nasal congestion, postnasal drip and sinus pressure/congestion. Negative for ear pain, facial swelling, hearing loss, mouth dryness, mouth sores, rhinorrhea, sore throat and goiter.    Eyes:  Negative for discharge and itching.   Respiratory:  Positive for  cough. Negative for shortness of breath and wheezing.    Cardiovascular:  Negative for chest pain and leg swelling.   Gastrointestinal:  Negative for abdominal pain, change in bowel habit, nausea, vomiting and change in bowel habit.   Genitourinary:  Negative for difficulty urinating, dysuria, enuresis, frequency, hematuria and urgency.   Neurological:  Positive for weakness and headaches. Negative for dizziness, vertigo and syncope.   Psychiatric/Behavioral:  Negative for decreased concentration.       Medical / Social / Family History     Past Medical History:   Diagnosis Date    ADHD (attention deficit hyperactivity disorder)     Anxiety     COVID 1/17/2022    Depression     Hypertension     Maxillary sinusitis 8/11/2021    Pneumonia of right middle lobe due to infectious organism 1/27/2022       Past Surgical History:   Procedure Laterality Date    TUBAL LIGATION         Social History  Ms.  reports that she has been smoking cigarettes and vaping with nicotine. She has been smoking an average of .5 packs per day. She has been exposed to tobacco smoke. She has never used smokeless tobacco. She reports current alcohol use. She reports that she does not use drugs.    Family History  Ms.'s family history includes Heart disease in her father.    Medications and Allergies     Medications  Outpatient Medications Marked as Taking for the 3/1/23 encounter (Office Visit) with CARLOS Santos   Medication Sig Dispense Refill    citalopram (CELEXA) 20 MG tablet Take 1 tablet (20 mg total) by mouth once daily. 30 tablet 11    dextroamphetamine-amphetamine (ADDERALL XR) 10 MG 24 hr capsule Take 1 capsule (10 mg total) by mouth every morning. 30 capsule 0    dextroamphetamine-amphetamine (ADDERALL XR) 20 MG 24 hr capsule Take 1 capsule (20 mg total) by mouth every morning. 30 capsule 0    omeprazole (PRILOSEC) 40 MG capsule Take 1 capsule (40 mg total) by mouth 2 (two) times daily. 90 capsule 1    sucralfate  "(CARAFATE) 1 gram tablet Take 1 tablet (1 g total) by mouth 2 (two) times daily. 60 tablet 0    telmisartan (MICARDIS) 40 MG Tab Take 1 tablet (40 mg total) by mouth once daily. 90 tablet 0       Allergies  Review of patient's allergies indicates:  No Known Allergies    Physical Examination     Vitals:    03/01/23 1618   BP: 118/80   BP Location: Right arm   Patient Position: Sitting   Pulse: 96   Resp: 18   Temp: 98.1 °F (36.7 °C)   TempSrc: Oral   SpO2: 99%   Weight: 85.3 kg (188 lb)   Height: 5' 5" (1.651 m)      Physical Exam  Constitutional:       Appearance: Normal appearance.   HENT:      Head: Normocephalic.      Right Ear: Tympanic membrane, ear canal and external ear normal.      Left Ear: Tympanic membrane, ear canal and external ear normal.      Nose: Nose normal.      Mouth/Throat:      Mouth: Mucous membranes are moist.      Pharynx: Oropharynx is clear.   Eyes:      Extraocular Movements: Extraocular movements intact.      Conjunctiva/sclera: Conjunctivae normal.      Pupils: Pupils are equal, round, and reactive to light.   Cardiovascular:      Rate and Rhythm: Normal rate and regular rhythm.      Pulses: Normal pulses.      Heart sounds: Normal heart sounds.   Pulmonary:      Effort: Pulmonary effort is normal.      Breath sounds: Normal breath sounds.   Abdominal:      General: Bowel sounds are normal.      Palpations: Abdomen is soft.   Musculoskeletal:         General: Normal range of motion.   Skin:     General: Skin is warm and dry.      Capillary Refill: Capillary refill takes less than 2 seconds.   Neurological:      General: No focal deficit present.      Mental Status: She is alert and oriented to person, place, and time.   Psychiatric:         Mood and Affect: Mood normal.         Behavior: Behavior normal.        Assessment and Plan (including Health Maintenance)      Problem List  Smart Sets  Document Outside HM   :    Plan:     Patient Instructions   Increase  fluids rest will add " naproxen d for headache and congestion      Health Maintenance Due   Topic Date Due    Hepatitis C Screening  Never done    Cervical Cancer Screening  Never done       Problem List Items Addressed This Visit    None  Visit Diagnoses       Viral illness    -  Primary    Cough with exposure to COVID-19 virus        Relevant Orders    SARS Coronavirus 2 Antigen, POCT (Completed)    Fever, unspecified fever cause        Relevant Orders    POCT Influenza A/B Rapid Antigen (Completed)          Viral illness    Cough with exposure to COVID-19 virus  -     SARS Coronavirus 2 Antigen, POCT    Fever, unspecified fever cause  -     POCT Influenza A/B Rapid Antigen    Other orders  -     naproxen-pseudoephedrine 220-120 mg Tb12; Take 1 tablet by mouth 2 (two) times a day. for 10 days  Dispense: 20 each; Refill: 0       Health Maintenance Topics with due status: Not Due       Topic Last Completion Date    Hemoglobin A1c (Diabetic Prevention Screening) 07/29/2022    TETANUS VACCINE 09/15/2022       Procedures     Future Appointments   Date Time Provider Department Center   3[facility]16[facility]2023  [facility]   1/17/2024  3:30 PM Beth Lundy MD [facility]        No follow-ups on file.     Signature:  CARLOS Santos    Date of encounter: 3/1/233/2/2023

## 2023-03-01 NOTE — LETTER
March 1, 2023      [facility]  [address]  [place] [address]  Phone: [phone]  Fax: [phone]       Patient: Earline Lemus   YOB: 1985  Date of Visit: 03/01/2023    To Whom It May Concern:    Aquilino Lemus  was at [facility] on 03/01/2023. The patient may return to work/school on 03/03/2023 with no restrictions. If you have any questions or concerns, or if I can be of further assistance, please do not hesitate to contact me.    Sincerely,    CARLOS Zamorano

## 2023-03-20 NOTE — PROGRESS NOTES
CARLOS Santos   [facility]  [address]  [place] [address]     PATIENT NAME: Earline Lemus  : 1985  DATE: 3/20/23  MRN: [id number]      Billing Provider: CARLOS Santos  Level of Service:   Patient PCP Information       Provider PCP Type    Beth Lundy MD General            Reason for Visit / Chief Complaint: ADHD, Nasal Congestion (Ears are stopped up and sore throat x2 weeks. ), and Sore Throat       Update PCP  Update Chief Complaint         History of Present Illness / Problem Focused Workflow     Earline Lemus presents to the clinic was on 30 xr and 20 mg immediate in afternoon since the shortage. Works ok would like to decrease dose some keeps her focused and able to complete task not bothering appetite or sleep has resumed celexa and doing better. She is having chronic nasal congestion and using otc medications and not resolving    Hemoglobin A1C   Date Value Ref Range Status   2022 5.3 4.5 - 6.6 % Final     Comment:       Normal:               <5.7%  Pre-Diabetic:       5.7% to 6.4%  Diabetic:             >6.4%  Diabetic Goal:     <7%   2021 5.3 4.5 - 6.6 % Final     Comment:       Normal:               <5.7%  Pre-Diabetic:       5.7% to 6.4%  Diabetic:             >6.4%  Diabetic Goal:     <7%        CMP  Sodium   Date Value Ref Range Status   2022 134 (L) 136 - 145 mmol/L Final     Potassium   Date Value Ref Range Status   2022 4.2 3.5 - 5.1 mmol/L Final     Chloride   Date Value Ref Range Status   2022 99 98 - 107 mmol/L Final     CO2   Date Value Ref Range Status   2022 29 21 - 32 mmol/L Final     Glucose   Date Value Ref Range Status   2022 112 (H) 74 - 106 mg/dL Final     BUN   Date Value Ref Range Status   2022 14 7 - 18 mg/dL Final     Creatinine   Date Value Ref Range Status   2022 0.90 0.55 - 1.02 mg/dL Final     Calcium   Date Value Ref Range Status   2022 9.8 8.5 - 10.1  mg/dL Final     Total Protein   Date Value Ref Range Status   07/29/2022 8.1 6.4 - 8.2 g/dL Final     Albumin   Date Value Ref Range Status   07/29/2022 3.8 3.5 - 5.0 g/dL Final     Bilirubin, Total   Date Value Ref Range Status   07/29/2022 0.3 0.0 - 1.2 mg/dL Final     Alk Phos   Date Value Ref Range Status   07/29/2022 89 37 - 98 U/L Final     AST   Date Value Ref Range Status   07/29/2022 22 15 - 37 U/L Final     ALT   Date Value Ref Range Status   07/29/2022 30 13 - 56 U/L Final     Anion Gap   Date Value Ref Range Status   07/29/2022 10 7 - 16 mmol/L Final        Lab Results   Component Value Date    WBC 11.05 (H) 07/29/2022    RBC 5.46 (H) 07/29/2022    HGB 14.9 07/29/2022    HCT 46.4 07/29/2022    MCV 85.0 07/29/2022    MCH 27.3 07/29/2022    MCHC 32.1 07/29/2022    RDW 14.7 (H) 07/29/2022     (H) 07/29/2022    MPV 9.9 07/29/2022    LYMPH 19.6 (L) 07/29/2022    LYMPH 2.17 07/29/2022    MONO 9.4 (H) 07/29/2022    EOS 0.16 07/29/2022    BASO 0.02 07/29/2022    EOSINOPHIL 1.4 07/29/2022    BASOPHIL 0.2 07/29/2022        Lab Results   Component Value Date    CHOL 200 07/29/2022    CHOL 214 (H) 06/30/2021     Lab Results   Component Value Date    HDL 38 (L) 07/29/2022    HDL 37 (L) 06/30/2021     Lab Results   Component Value Date    LDLCALC 138 07/29/2022    LDLCALC 163 06/30/2021     Lab Results   Component Value Date    TRIG 122 07/29/2022    TRIG 69 06/30/2021     Lab Results   Component Value Date    CHOLHDL 5.3 07/29/2022    CHOLHDL 5.8 06/30/2021        Wt Readings from Last 3 Encounters:   03/20/23 1616 87.1 kg (192 lb)   03/01/23 1618 85.3 kg (188 lb)   01/17/23 1631 86.2 kg (190 lb)        BP Readings from Last 3 Encounters:   03/20/23 (!) 132/90   03/01/23 118/80   01/17/23 (!) 120/90        Review of Systems     Review of Systems   Constitutional:  Negative for activity change, appetite change, fatigue and fever.   HENT:  Positive for nasal congestion, postnasal drip and sinus  pressure/congestion. Negative for ear pain and sore throat.    Eyes:  Negative for visual disturbance.   Respiratory:  Negative for cough, chest tightness, shortness of breath and wheezing.    Cardiovascular:  Negative for chest pain and palpitations.   Gastrointestinal:  Negative for abdominal pain.   Integumentary:  Negative for rash.   Neurological:  Negative for dizziness, weakness and headaches.   Psychiatric/Behavioral:  Negative for decreased concentration and dysphoric mood.       Medical / Social / Family History     Past Medical History:   Diagnosis Date    ADHD (attention deficit hyperactivity disorder)     Anxiety     COVID 1/17/2022    Depression     Hypertension     Maxillary sinusitis 8/11/2021    Pneumonia of right middle lobe due to infectious organism 1/27/2022       Past Surgical History:   Procedure Laterality Date    TUBAL LIGATION         Social History  Ms.  reports that she has been smoking cigarettes and vaping with nicotine. She has been smoking an average of .5 packs per day. She has been exposed to tobacco smoke. She has never used smokeless tobacco. She reports current alcohol use. She reports that she does not use drugs.    Family History  Ms.'s family history includes Heart disease in her father.    Medications and Allergies     Medications  Outpatient Medications Marked as Taking for the 3/20/23 encounter (Office Visit) with CARLOS Santos   Medication Sig Dispense Refill    citalopram (CELEXA) 20 MG tablet Take 1 tablet (20 mg total) by mouth once daily. 30 tablet 11    dextroamphetamine-amphetamine (ADDERALL XR) 20 MG 24 hr capsule Take 1 capsule (20 mg total) by mouth every morning. 30 capsule 0    omeprazole (PRILOSEC) 40 MG capsule Take 1 capsule (40 mg total) by mouth 2 (two) times daily. 90 capsule 1    sucralfate (CARAFATE) 1 gram tablet Take 1 tablet (1 g total) by mouth 2 (two) times daily. 60 tablet 0    telmisartan (MICARDIS) 40 MG Tab Take 1 tablet (40 mg  "total) by mouth once daily. 90 tablet 0    [DISCONTINUED] dextroamphetamine-amphetamine (ADDERALL XR) 10 MG 24 hr capsule Take 1 capsule (10 mg total) by mouth every morning. 30 capsule 0       Allergies  Review of patient's allergies indicates:  No Known Allergies    Physical Examination     Vitals:    03/20/23 1616   BP: (!) 132/90   BP Location: Left arm   Patient Position: Sitting   Pulse: 83   Resp: 18   Temp: 97.7 °F (36.5 °C)   TempSrc: Oral   SpO2: 99%   Weight: 87.1 kg (192 lb)   Height: 5' 5" (1.651 m)      Physical Exam  Constitutional:       Appearance: Normal appearance. She is obese. She is not ill-appearing.   HENT:      Right Ear: Tympanic membrane is bulging.      Left Ear: Tympanic membrane is bulging.      Nose:      Right Turbinates: Swollen.      Left Turbinates: Swollen.      Right Sinus: Maxillary sinus tenderness present.      Left Sinus: Maxillary sinus tenderness present.      Mouth/Throat:      Mouth: Mucous membranes are moist.      Pharynx: Posterior oropharyngeal erythema present.   Cardiovascular:      Rate and Rhythm: Normal rate and regular rhythm.   Pulmonary:      Effort: Pulmonary effort is normal.      Breath sounds: Normal breath sounds.   Abdominal:      General: Abdomen is flat. Bowel sounds are normal.   Neurological:      Mental Status: She is alert and oriented to person, place, and time.   Psychiatric:         Mood and Affect: Mood normal.         Behavior: Behavior normal.         Judgment: Judgment normal.        Assessment and Plan (including Health Maintenance)      Problem List  Smart Sets  Document Outside HM   :    Plan:     There are no Patient Instructions on file for this visit.     Health Maintenance Due   Topic Date Due    Hepatitis C Screening  Never done    Cervical Cancer Screening  Never done       Problem List Items Addressed This Visit          Psychiatric    Hx of attention deficit disorder - Primary    Current Assessment & Plan     Shortage on " medication will start adderall xr 20 mg morning and adderall 20 mg afternoon for symptoms control,  reviewed uds performed will continue to monitor            ENT    Sinusitis    Current Assessment & Plan     Continue claritin and atrovent will add augmentin for unresolved sinusitis increase fluids will continue to monitor         Relevant Medications    amoxicillin-clavulanate 500-125mg (AUGMENTIN) 500-125 mg Tab     Other Visit Diagnoses       Encounter for long-term (current) use of other medications        Relevant Orders    POCT Urine Drug Screen Presump (Completed)          Hx of attention deficit disorder    Encounter for long-term (current) use of other medications  -     POCT Urine Drug Screen Presump    Sinusitis, unspecified chronicity, unspecified location  -     amoxicillin-clavulanate 500-125mg (AUGMENTIN) 500-125 mg Tab; Take 1 tablet (500 mg total) by mouth 2 (two) times daily. for 10 days  Dispense: 20 tablet; Refill: 0    Other orders  -     dextroamphetamine-amphetamine (ADDERALL XR) 20 MG 24 hr capsule; Take 1 capsule (20 mg total) by mouth every morning.  Dispense: 30 capsule; Refill: 0  -     dextroamphetamine-amphetamine (ADDERALL XR) 20 MG 24 hr capsule; Take 1 capsule (20 mg total) by mouth every morning.  Dispense: 30 capsule; Refill: 0  -     dextroamphetamine-amphetamine (ADDERALL XR) 20 MG 24 hr capsule; Take 1 capsule (20 mg total) by mouth every morning.  Dispense: 30 capsule; Refill: 0  -     dextroamphetamine-amphetamine (ADDERALL) 20 mg tablet; Take 1 tablet by mouth once daily.  Dispense: 30 tablet; Refill: 0  -     dextroamphetamine-amphetamine (ADDERALL) 20 mg tablet; Take 1 tablet by mouth once daily.  Dispense: 30 tablet; Refill: 0  -     dextroamphetamine-amphetamine (ADDERALL) 20 mg tablet; Take 1 tablet by mouth once daily.  Dispense: 30 tablet; Refill: 0       Health Maintenance Topics with due status: Not Due       Topic Last Completion Date    Hemoglobin A1c (Diabetic  Prevention Screening) 07/29/2022    TETANUS VACCINE 09/15/2022       Procedures     Future Appointments   Date Time Provider Department Center   6/20/2023  4:15 [facility]   1/17/2024  3:30 PM Beth Lundy MD [facility]        Follow up in about 3 months (around 6/20/2023), or if symptoms worsen or fail to improve.     Signature:  CARLOS Santos    Date of encounter: 3/20/233/21/2023

## 2023-03-21 PROBLEM — S92.355A CLOSED NONDISPLACED FRACTURE OF FIFTH LEFT METATARSAL BONE: Status: RESOLVED | Noted: 2022-08-26 | Resolved: 2023-01-01

## 2023-03-21 PROBLEM — M79.672 LEFT FOOT PAIN: Status: RESOLVED | Noted: 2022-08-26 | Resolved: 2023-01-01

## 2023-03-21 PROBLEM — E86.0 DEHYDRATION: Status: RESOLVED | Noted: 2022-07-29 | Resolved: 2023-01-01

## 2023-03-21 PROBLEM — J32.9 SINUSITIS: Status: ACTIVE | Noted: 2021-08-11

## 2023-03-21 PROBLEM — Z20.822 EXPOSURE TO COVID-19 VIRUS: Status: RESOLVED | Noted: 2021-10-29 | Resolved: 2023-01-01

## 2023-03-21 NOTE — ASSESSMENT & PLAN NOTE
Shortage on medication will start adderall xr 20 mg morning and adderall 20 mg afternoon for symptoms control,  reviewed uds performed will continue to monitor

## 2023-03-21 NOTE — ASSESSMENT & PLAN NOTE
Continue claritin and atrovent will add augmentin for unresolved sinusitis increase fluids will continue to monitor

## 2023-04-21 NOTE — PROGRESS NOTES
CARLOS Santos   [facility]  [address]  [place] [address]     PATIENT NAME: Earline Lemus  : 1985  DATE: 23  MRN: [id number]      Billing Provider: CARLOS Santos  Level of Service:   Patient PCP Information       Provider PCP Type    CARLOS Santos [facility]            Reason for Visit / Chief Complaint: Sinus Problem (X2 weeks. Progressively gotten worse. Is interested in having allergy testing. ) and Cough (With no production. )   Health Maintenance Due   Topic Date Due    Hepatitis C Screening  Never done    Cervical Cancer Screening  Never done          Update PCP  Update Chief Complaint         History of Present Illness / Problem Focused Workflow     Earline Lemus presents to the clinic sinus congestion feels like can not get enough oxygen, unable to breath through nose and face feels swollen. She is not taking any allergy sius medications currently and wishes to see allergist.     Hemoglobin A1C   Date Value Ref Range Status   2022 5.3 4.5 - 6.6 % Final     Comment:       Normal:               <5.7%  Pre-Diabetic:       5.7% to 6.4%  Diabetic:             >6.4%  Diabetic Goal:     <7%   2021 5.3 4.5 - 6.6 % Final     Comment:       Normal:               <5.7%  Pre-Diabetic:       5.7% to 6.4%  Diabetic:             >6.4%  Diabetic Goal:     <7%        CMP  Sodium   Date Value Ref Range Status   2022 134 (L) 136 - 145 mmol/L Final     Potassium   Date Value Ref Range Status   2022 4.2 3.5 - 5.1 mmol/L Final     Chloride   Date Value Ref Range Status   2022 99 98 - 107 mmol/L Final     CO2   Date Value Ref Range Status   2022 29 21 - 32 mmol/L Final     Glucose   Date Value Ref Range Status   2022 112 (H) 74 - 106 mg/dL Final     BUN   Date Value Ref Range Status   2022 14 7 - 18 mg/dL Final     Creatinine   Date Value Ref Range Status   2022 0.90 0.55 - 1.02 mg/dL Final      Calcium   Date Value Ref Range Status   07/29/2022 9.8 8.5 - 10.1 mg/dL Final     Total Protein   Date Value Ref Range Status   07/29/2022 8.1 6.4 - 8.2 g/dL Final     Albumin   Date Value Ref Range Status   07/29/2022 3.8 3.5 - 5.0 g/dL Final     Bilirubin, Total   Date Value Ref Range Status   07/29/2022 0.3 0.0 - 1.2 mg/dL Final     Alk Phos   Date Value Ref Range Status   07/29/2022 89 37 - 98 U/L Final     AST   Date Value Ref Range Status   07/29/2022 22 15 - 37 U/L Final     ALT   Date Value Ref Range Status   07/29/2022 30 13 - 56 U/L Final     Anion Gap   Date Value Ref Range Status   07/29/2022 10 7 - 16 mmol/L Final        Lab Results   Component Value Date    WBC 11.05 (H) 07/29/2022    RBC 5.46 (H) 07/29/2022    HGB 14.9 07/29/2022    HCT 46.4 07/29/2022    MCV 85.0 07/29/2022    MCH 27.3 07/29/2022    MCHC 32.1 07/29/2022    RDW 14.7 (H) 07/29/2022     (H) 07/29/2022    MPV 9.9 07/29/2022    LYMPH 19.6 (L) 07/29/2022    LYMPH 2.17 07/29/2022    MONO 9.4 (H) 07/29/2022    EOS 0.16 07/29/2022    BASO 0.02 07/29/2022    EOSINOPHIL 1.4 07/29/2022    BASOPHIL 0.2 07/29/2022        Lab Results   Component Value Date    CHOL 200 07/29/2022    CHOL 214 (H) 06/30/2021     Lab Results   Component Value Date    HDL 38 (L) 07/29/2022    HDL 37 (L) 06/30/2021     Lab Results   Component Value Date    LDLCALC 138 07/29/2022    LDLCALC 163 06/30/2021     Lab Results   Component Value Date    TRIG 122 07/29/2022    TRIG 69 06/30/2021     Lab Results   Component Value Date    CHOLHDL 5.3 07/29/2022    CHOLHDL 5.8 06/30/2021        Wt Readings from Last 3 Encounters:   04/21/23 1552 86.2 kg (190 lb)   03/20/23 1616 87.1 kg (192 lb)   03/01/23 1618 85.3 kg (188 lb)        BP Readings from Last 3 Encounters:   04/21/23 122/87   03/20/23 (!) 132/90   03/01/23 118/80        Review of Systems     Review of Systems   Constitutional:  Negative for appetite change.   HENT:  Positive for nasal congestion, facial  swelling, mouth dryness, nosebleeds, sinus pressure/congestion, sneezing and tinnitus. Negative for sore throat and trouble swallowing.    Eyes:  Positive for eye dryness.   Respiratory:  Positive for cough and shortness of breath. Negative for chest tightness and wheezing.    Cardiovascular:  Negative for chest pain and palpitations.   Gastrointestinal:  Negative for abdominal pain.   Integumentary:  Positive for rash.   Neurological:  Negative for headaches.      Medical / Social / Family History     Past Medical History:   Diagnosis Date    ADHD (attention deficit hyperactivity disorder)     Anxiety     COVID 1/17/2022    Depression     Hypertension     Maxillary sinusitis 8/11/2021    Pneumonia of right middle lobe due to infectious organism 1/27/2022       Past Surgical History:   Procedure Laterality Date    TUBAL LIGATION         Social History  Ms.  reports that she has been smoking cigarettes and vaping with nicotine. She has been smoking an average of .5 packs per day. She has been exposed to tobacco smoke. She has never used smokeless tobacco. She reports current alcohol use. She reports that she does not use drugs.    Family History  Ms.'s family history includes Heart disease in her father.    Medications and Allergies     Medications  Outpatient Medications Marked as Taking for the 4/21/23 encounter (Office Visit) with CARLOS Santos   Medication Sig Dispense Refill    citalopram (CELEXA) 20 MG tablet Take 1 tablet (20 mg total) by mouth once daily. 30 tablet 11    [START ON 5/15/2023] dextroamphetamine-amphetamine (ADDERALL XR) 20 MG 24 hr capsule Take 1 capsule (20 mg total) by mouth every morning. 30 capsule 0    [START ON 5/15/2023] dextroamphetamine-amphetamine (ADDERALL) 20 mg tablet Take 1 tablet by mouth once daily. 30 tablet 0    omeprazole (PRILOSEC) 40 MG capsule Take 1 capsule (40 mg total) by mouth 2 (two) times daily. 90 capsule 1    sucralfate (CARAFATE) 1  "gram tablet Take 1 tablet (1 g total) by mouth 2 (two) times daily. 60 tablet 0    telmisartan (MICARDIS) 40 MG Tab Take 1 tablet (40 mg total) by mouth once daily. 90 tablet 0       Allergies  Review of patient's allergies indicates:  No Known Allergies    Physical Examination     Vitals:    04/21/23 1552   BP: 122/87   BP Location: Left arm   Patient Position: Sitting   Pulse: 92   Resp: 18   Temp: 97.4 °F (36.3 °C)   TempSrc: Oral   SpO2: 99%   Weight: 86.2 kg (190 lb)   Height: 5' 5" (1.651 m)      Physical Exam  Vitals and nursing note reviewed.   Constitutional:       Appearance: Normal appearance.   HENT:      Right Ear: Tympanic membrane, ear canal and external ear normal.      Left Ear: Tympanic membrane, ear canal and external ear normal.      Nose: Mucosal edema present.      Right Turbinates: Swollen.      Left Turbinates: Swollen.      Comments: Allergic salute     Mouth/Throat:      Mouth: Mucous membranes are dry.      Pharynx: No posterior oropharyngeal erythema or uvula swelling.   Cardiovascular:      Rate and Rhythm: Normal rate and regular rhythm.      Pulses: Normal pulses.      Heart sounds: Normal heart sounds.   Musculoskeletal:      Cervical back: Normal range of motion and neck supple.   Skin:     Findings: Erythema present.      Comments: flushed   Neurological:      Mental Status: She is alert and oriented to person, place, and time.        Assessment and Plan (including Health Maintenance)      Problem List  Smart Sets  Document Outside HM   :    Plan:     There are no Patient Instructions on file for this visit.   Health Maintenance Due   Topic Date Due    Hepatitis C Screening  Never done    Cervical Cancer Screening  Never done        Health Maintenance Due   Topic Date Due    Hepatitis C Screening  Never done    Cervical Cancer Screening  Never done       Problem List Items Addressed This Visit    None    There are no diagnoses linked to this encounter.   Health Maintenance " Topics with due status: Not Due       Topic Last Completion Date    Hemoglobin A1c (Diabetic Prevention Screening) 07/29/2022    TETANUS VACCINE 09/15/2022       Procedures   Health Maintenance Due   Topic Date Due    Hepatitis C Screening  Never done    Cervical Cancer Screening  Never done        Future Appointments   Date Time Provider Department Center   6/20/2023  4:15 PM [facility]   1/17/2024  3:30 PM Beth Lundy MD [facility]        No follow-ups on file.    Health Maintenance Due   Topic Date Due    Hepatitis C Screening  Never done    Cervical Cancer Screening  Never done        Signature:  CARLOS Santos    Date of encounter: 4/21/234/21/2023

## 2023-05-05 PROBLEM — J01.01 ACUTE RECURRENT MAXILLARY SINUSITIS: Status: ACTIVE | Noted: 2022-01-27

## 2023-05-05 NOTE — PROGRESS NOTES
"   Shira Harry NP   [facility]  [address]  [place]     PATIENT NAME: Earline Lemus  : 1985  DATE: 23  MRN: [id number]      Billing Provider: Shira Harry NP  Level of Service:   Patient PCP Information       Provider PCP Type    CARLOS Santos [facility]            Reason for Visit / Chief Complaint: Sinus Problem (Ongoing sinus issues)       Update PCP  Update Chief Complaint         History of Present Illness / Problem Focused Workflow     Earline Lemus presents to the clinic c/o sinus problem, "for a while", has karol with allergy specialist      Review of Systems     Review of Systems   Constitutional:  Negative for chills, fatigue and fever.   HENT:  Positive for nasal congestion, sinus pressure/congestion and voice change. Negative for ear pain, facial swelling, hearing loss, mouth dryness, mouth sores, postnasal drip, rhinorrhea and goiter.    Eyes:  Negative for discharge and itching.   Respiratory:  Negative for cough, shortness of breath and wheezing.    Cardiovascular:  Negative for chest pain and leg swelling.   Gastrointestinal:  Negative for abdominal pain, change in bowel habit and change in bowel habit.   Genitourinary:  Negative for difficulty urinating, dysuria, enuresis, frequency, hematuria and urgency.   Neurological:  Negative for dizziness, vertigo, syncope, weakness and headaches.   Psychiatric/Behavioral:  Negative for decreased concentration.       Medical / Social / Family History     Past Medical History:   Diagnosis Date    ADHD (attention deficit hyperactivity disorder)     Anxiety     COVID 2022    Depression     Hypertension     Maxillary sinusitis 2021    Pneumonia of right middle lobe due to infectious organism 2022       Past Surgical History:   Procedure Laterality Date    TUBAL LIGATION         Social History  Ms.  reports that she has been smoking cigarettes and vaping with nicotine. She has been smoking an average of .5 " "packs per day. She has been exposed to tobacco smoke. She has never used smokeless tobacco. She reports current alcohol use. She reports that she does not use drugs.    Family History  Ms.'s family history includes Heart disease in her father.    Medications and Allergies     Medications  Outpatient Medications Marked as Taking for the 5/5/23 encounter (Office Visit) with Shira Harry NP   Medication Sig Dispense Refill    budesonide (RINOCORT AQUA) 32 mcg/actuation nasal spray 2 sprays (64 mcg total) by Nasal route once daily. 8.43 mL 2    citalopram (CELEXA) 20 MG tablet Take 1 tablet (20 mg total) by mouth once daily. 30 tablet 11    [START ON 5/15/2023] dextroamphetamine-amphetamine (ADDERALL XR) 20 MG 24 hr capsule Take 1 capsule (20 mg total) by mouth every morning. 30 capsule 0    [START ON 5/15/2023] dextroamphetamine-amphetamine (ADDERALL) 20 mg tablet Take 1 tablet by mouth once daily. 30 tablet 0    omeprazole (PRILOSEC) 40 MG capsule Take 1 capsule (40 mg total) by mouth 2 (two) times daily. 90 capsule 1    sucralfate (CARAFATE) 1 gram tablet Take 1 tablet (1 g total) by mouth 2 (two) times daily. 60 tablet 0    telmisartan (MICARDIS) 40 MG Tab Take 1 tablet (40 mg total) by mouth once daily. 90 tablet 0     Current Facility-Administered Medications for the 5/5/23 encounter (Office Visit) with Shira Harry NP   Medication Dose Route Frequency Provider Last Rate Last Admin    [COMPLETED] cefTRIAXone injection 1 g  1 g Intramuscular 1 time in Clinic/HOD Shira Harry NP   1 g at 05/05/23 0837       Allergies  Review of patient's allergies indicates:  No Known Allergies    Physical Examination     Vitals:    05/05/23 0817 05/05/23 0838   BP: 136/84 118/79   BP Location: Left arm Right arm   Patient Position: Sitting Sitting   Pulse: 89    Resp: 18    Temp: 97.7 °F (36.5 °C)    TempSrc: Oral    SpO2: 96%    Weight: 87.1 kg (192 lb)    Height: 5' 5" (1.651 m)       Physical Exam  Constitutional:       " Appearance: Normal appearance.   HENT:      Head: Normocephalic.      Right Ear: Tympanic membrane, ear canal and external ear normal.      Left Ear: Tympanic membrane, ear canal and external ear normal.      Nose: Congestion present.      Comments: Mod amt thick yellow nasal secretion, pressure over max region     Mouth/Throat:      Mouth: Mucous membranes are moist.   Eyes:      Extraocular Movements: Extraocular movements intact.      Conjunctiva/sclera: Conjunctivae normal.      Pupils: Pupils are equal, round, and reactive to light.   Neck:      Comments: Gianluca ant cervical nodes  Cardiovascular:      Rate and Rhythm: Normal rate and regular rhythm.      Pulses: Normal pulses.      Heart sounds: Normal heart sounds.   Pulmonary:      Effort: Pulmonary effort is normal.      Breath sounds: Normal breath sounds.   Musculoskeletal:         General: Normal range of motion.      Cervical back: Normal range of motion.   Lymphadenopathy:      Cervical: Cervical adenopathy present.   Skin:     General: Skin is warm and dry.   Neurological:      General: No focal deficit present.      Mental Status: She is alert and oriented to person, place, and time.   Psychiatric:         Behavior: Behavior normal.        Assessment and Plan (including Health Maintenance)      Problem List  Smart Sets  Document Outside HM   :    Plan:     Acute recurrent maxillary sinusitis    Other orders  -     cefTRIAXone injection 1 g  -     chlorpheniramine-phenylephrine (ED A-HIST) 4-10 mg per tablet; Take 1 tablet by mouth every 6 (six) hours as needed for Congestion.  Dispense: 30 tablet; Refill: 0  -     doxycycline (MONODOX) 100 MG capsule; Take 1 capsule (100 mg total) by mouth 2 (two) times daily.  Dispense: 20 capsule; Refill: 0            Health Maintenance Due   Topic Date Due    Hepatitis C Screening  Never done    Cervical Cancer Screening  Never done       Problem List Items Addressed This Visit          ENT    Acute recurrent  maxillary sinusitis - Primary    Current Assessment & Plan     Avoid irritants, stop smoking/vaping, use saline nose spray , vicks inhaler, meds as ordered, return to clinic as needed  \  Keep karol with allergy specialist              Health Maintenance Topics with due status: Not Due       Topic Last Completion Date    Hemoglobin A1c (Diabetic Prevention Screening) 07/29/2022    TETANUS VACCINE 09/15/2022       Procedures     Future Appointments   Date Time Provider Department Center   6/20/2023  4:15 PM CARLOS Santos [facility]        Follow up if symptoms worsen or fail to improve.       Signature:  Shira Harry NP    Date of encounter: 5/5/23

## 2023-05-05 NOTE — ASSESSMENT & PLAN NOTE
Avoid irritants, stop smoking/vaping, use saline nose spray , vicks inhaler, meds as ordered, return to clinic as needed  \  Keep karol with allergy specialist

## 2023-05-05 NOTE — PATIENT INSTRUCTIONS
Avoid irritants, stop smoking/vaping, use saline nose spray , vicks inhaler, meds as ordered, return to clinic as needed  \  Keep karol with allergy specialist    Monitor bp due to edahist

## 2023-06-20 PROBLEM — J01.01 ACUTE RECURRENT MAXILLARY SINUSITIS: Status: RESOLVED | Noted: 2022-01-27 | Resolved: 2023-01-01

## 2023-06-20 PROBLEM — J32.9 SINUSITIS: Status: RESOLVED | Noted: 2021-08-11 | Resolved: 2023-01-01

## 2023-06-20 PROBLEM — R05.9 COUGH: Status: RESOLVED | Noted: 2021-10-29 | Resolved: 2023-01-01

## 2023-06-20 NOTE — ASSESSMENT & PLAN NOTE
Shortage persists will change to mydaisis has tolerated well in the past  reviewed uds performed consistent without diversion will continue to monitor

## 2023-06-20 NOTE — ASSESSMENT & PLAN NOTE
Micardis.  Blood pressure is controlled. Current medical regimen is effective;   Will adjust according to results and monitor.

## 2023-06-20 NOTE — PROGRESS NOTES
CARLOS Santos   [facility]  [address]  [place] [address]     PATIENT NAME: Earline Lemus  : 1985  DATE: 23  MRN: [id number]      Billing Provider: CARLOS Santos  Level of Service:   Patient PCP Information       Provider PCP Type    CARLOS Santos [facility]            Reason for Visit / Chief Complaint: ADHD (Having issues getting Adderall XR because of it being on back order, Mydayis has been on 50mg daily)   Health Maintenance Due   Topic Date Due    Hepatitis C Screening  Never done    Cervical Cancer Screening  Never done    Mammogram  Never done          Update PCP  Update Chief Complaint         History of Present Illness / Problem Focused Workflow     Earline Lemus presents to the clinic follow up ADHD difficulty obtaining medication due to shortage  will change to back to mydasis. She tolerated well and is available more than adderall. UDS performed  reviewed consistent without diversion.     Hemoglobin A1C   Date Value Ref Range Status   2022 5.3 4.5 - 6.6 % Final     Comment:       Normal:               <5.7%  Pre-Diabetic:       5.7% to 6.4%  Diabetic:             >6.4%  Diabetic Goal:     <7%   2021 5.3 4.5 - 6.6 % Final     Comment:       Normal:               <5.7%  Pre-Diabetic:       5.7% to 6.4%  Diabetic:             >6.4%  Diabetic Goal:     <7%        CMP  Sodium   Date Value Ref Range Status   2022 134 (L) 136 - 145 mmol/L Final     Potassium   Date Value Ref Range Status   2022 4.2 3.5 - 5.1 mmol/L Final     Chloride   Date Value Ref Range Status   2022 99 98 - 107 mmol/L Final     CO2   Date Value Ref Range Status   2022 29 21 - 32 mmol/L Final     Glucose   Date Value Ref Range Status   2022 112 (H) 74 - 106 mg/dL Final     BUN   Date Value Ref Range Status   2022 14 7 - 18 mg/dL Final     Creatinine   Date Value Ref Range Status   2022 0.90 0.55 - 1.02  mg/dL Final     Calcium   Date Value Ref Range Status   07/29/2022 9.8 8.5 - 10.1 mg/dL Final     Total Protein   Date Value Ref Range Status   07/29/2022 8.1 6.4 - 8.2 g/dL Final     Albumin   Date Value Ref Range Status   07/29/2022 3.8 3.5 - 5.0 g/dL Final     Bilirubin, Total   Date Value Ref Range Status   07/29/2022 0.3 0.0 - 1.2 mg/dL Final     Alk Phos   Date Value Ref Range Status   07/29/2022 89 37 - 98 U/L Final     AST   Date Value Ref Range Status   07/29/2022 22 15 - 37 U/L Final     ALT   Date Value Ref Range Status   07/29/2022 30 13 - 56 U/L Final     Anion Gap   Date Value Ref Range Status   07/29/2022 10 7 - 16 mmol/L Final        Lab Results   Component Value Date    WBC 11.05 (H) 07/29/2022    RBC 5.46 (H) 07/29/2022    HGB 14.9 07/29/2022    HCT 46.4 07/29/2022    MCV 85.0 07/29/2022    MCH 27.3 07/29/2022    MCHC 32.1 07/29/2022    RDW 14.7 (H) 07/29/2022     (H) 07/29/2022    MPV 9.9 07/29/2022    LYMPH 19.6 (L) 07/29/2022    LYMPH 2.17 07/29/2022    MONO 9.4 (H) 07/29/2022    EOS 0.16 07/29/2022    BASO 0.02 07/29/2022    EOSINOPHIL 1.4 07/29/2022    BASOPHIL 0.2 07/29/2022        Lab Results   Component Value Date    CHOL 200 07/29/2022    CHOL 214 (H) 06/30/2021     Lab Results   Component Value Date    HDL 38 (L) 07/29/2022    HDL 37 (L) 06/30/2021     Lab Results   Component Value Date    LDLCALC 138 07/29/2022    LDLCALC 163 06/30/2021     Lab Results   Component Value Date    TRIG 122 07/29/2022    TRIG 69 06/30/2021     Lab Results   Component Value Date    CHOLHDL 5.3 07/29/2022    CHOLHDL 5.8 06/30/2021        Wt Readings from Last 3 Encounters:   06/20/23 1608 88 kg (194 lb)   05/05/23 0817 87.1 kg (192 lb)   04/21/23 1552 86.2 kg (190 lb)        BP Readings from Last 3 Encounters:   06/20/23 125/87   05/05/23 118/79   04/21/23 122/87        Review of Systems     Review of Systems   Constitutional:  Negative for activity change, appetite change, fatigue and fever.   HENT:   Negative for nasal congestion, ear pain, postnasal drip, sinus pressure/congestion and sore throat.    Eyes:  Negative for visual disturbance.   Respiratory:  Negative for cough, chest tightness, shortness of breath and wheezing.    Cardiovascular:  Negative for chest pain and palpitations.   Gastrointestinal:  Negative for abdominal pain.   Integumentary:  Negative for rash.   Neurological:  Negative for dizziness, weakness and headaches.   Psychiatric/Behavioral:  Negative for decreased concentration, dysphoric mood and sleep disturbance. The patient is not nervous/anxious.       Medical / Social / Family History     Past Medical History:   Diagnosis Date    ADHD (attention deficit hyperactivity disorder)     Anxiety     COVID 1/17/2022    Depression     Hypertension     Maxillary sinusitis 8/11/2021    Pneumonia of right middle lobe due to infectious organism 1/27/2022       Past Surgical History:   Procedure Laterality Date    TUBAL LIGATION         Social History  Ms.  reports that she has been smoking cigarettes and vaping with nicotine. She has been smoking an average of .5 packs per day. She has been exposed to tobacco smoke. She has never used smokeless tobacco. She reports current alcohol use. She reports that she does not use drugs.    Family History  Ms.'s family history includes Heart disease in her father.    Medications and Allergies     Medications  Outpatient Medications Marked as Taking for the 6/20/23 encounter (Office Visit) with CARLOS Santos   Medication Sig Dispense Refill    citalopram (CELEXA) 20 MG tablet Take 1 tablet (20 mg total) by mouth once daily. 30 tablet 11    sucralfate (CARAFATE) 1 gram tablet Take 1 tablet (1 g total) by mouth 2 (two) times daily. 60 tablet 0    [DISCONTINUED] dextroamphetamine-amphetamine (ADDERALL) 20 mg tablet Take 1 tablet by mouth once daily. 30 tablet 0    [DISCONTINUED] omeprazole (PRILOSEC) 40 MG capsule Take 1 capsule (40 mg total) by  "mouth 2 (two) times daily. 90 capsule 1    [DISCONTINUED] telmisartan (MICARDIS) 40 MG Tab Take 1 tablet (40 mg total) by mouth once daily. 90 tablet 1       Allergies  Review of patient's allergies indicates:  No Known Allergies    Physical Examination     Vitals:    06/20/23 1608   BP: 125/87   BP Location: Left arm   Patient Position: Sitting   Pulse: 104   Resp: 18   Temp: 98 °F (36.7 °C)   TempSrc: Oral   SpO2: 98%   Weight: 88 kg (194 lb)   Height: 5' 5" (1.651 m)      Physical Exam  Vitals and nursing note reviewed.   Constitutional:       Appearance: Normal appearance.   HENT:      Right Ear: External ear normal.      Left Ear: External ear normal.      Mouth/Throat:      Mouth: Mucous membranes are moist.   Eyes:      Conjunctiva/sclera: Conjunctivae normal.      Pupils: Pupils are equal, round, and reactive to light.   Cardiovascular:      Rate and Rhythm: Normal rate and regular rhythm.      Heart sounds: Normal heart sounds.   Pulmonary:      Effort: Pulmonary effort is normal.      Breath sounds: Normal breath sounds.   Skin:     Capillary Refill: Capillary refill takes less than 2 seconds.   Neurological:      Mental Status: She is alert and oriented to person, place, and time.   Psychiatric:         Mood and Affect: Mood normal.         Behavior: Behavior normal.        Assessment and Plan (including Health Maintenance)      Problem List  Smart Sets  Document Outside HM   :    Plan:     There are no Patient Instructions on file for this visit.       Health Maintenance Due   Topic Date Due    Hepatitis C Screening  Never done    Cervical Cancer Screening  Never done    Mammogram  Never done       Problem List Items Addressed This Visit          Psychiatric    Hx of attention deficit disorder    Current Assessment & Plan     Shortage persists will change to mydaisis has tolerated well in the past  reviewed uds performed consistent without diversion will continue to monitor         Relevant " Medications    dextroamphetamine-amphetamine (MYDAYIS) 50 mg CT24    dextroamphetamine-amphetamine (MYDAYIS) 50 mg CT24 (Start on 7/20/2023)    dextroamphetamine-amphetamine (MYDAYIS) 50 mg CT24 (Start on 8/18/2023)       Cardiac/Vascular    Hypertension    Current Assessment & Plan     Micardis.  Blood pressure is controlled. Current medical regimen is effective;   Will adjust according to results and monitor.         Relevant Medications    telmisartan (MICARDIS) 40 MG Tab     Other Visit Diagnoses       Encounter for long-term (current) use of other medications    -  Primary    Relevant Orders    POCT Urine Drug Screen Presump (Completed)    POCT EKG 12-LEAD (Manually Resulted by Ordering Provider)    Gastroesophageal reflux disease without esophagitis        Relevant Medications    omeprazole (PRILOSEC) 40 MG capsule          Encounter for long-term (current) use of other medications  -     POCT Urine Drug Screen Presump  -     POCT EKG 12-LEAD (Manually Resulted by Ordering Provider)    Primary hypertension  -     telmisartan (MICARDIS) 40 MG Tab; Take 1 tablet (40 mg total) by mouth once daily.  Dispense: 90 tablet; Refill: 1    Gastroesophageal reflux disease without esophagitis  -     omeprazole (PRILOSEC) 40 MG capsule; Take 1 capsule (40 mg total) by mouth 2 (two) times daily.  Dispense: 90 capsule; Refill: 1    Hx of attention deficit disorder  -     dextroamphetamine-amphetamine (MYDAYIS) 50 mg CT24; Take 1 capsule by mouth once daily.  Dispense: 30 each; Refill: 0  -     dextroamphetamine-amphetamine (MYDAYIS) 50 mg CT24; Take 1 capsule by mouth once daily.  Dispense: 30 each; Refill: 0  -     dextroamphetamine-amphetamine (MYDAYIS) 50 mg CT24; Take 1 capsule by mouth once daily.  Dispense: 30 each; Refill: 0       Health Maintenance Topics with due status: Not Due       Topic Last Completion Date    Hemoglobin A1c (Diabetic Prevention Screening) 07/29/2022    TETANUS VACCINE 09/15/2022         Future  Appointments   Date Time Provider Department Center   9/19/2023  3:45 PM CARLOS Santos [facility]   [facility]22[facility]24  [facility]        No follow-ups on file.    Health Maintenance Due   Topic Date Due    Hepatitis C Screening  Never done    Cervical Cancer Screening  Never done    Mammogram  Never done        Signature:  CARLOS Santos    Date of encounter: 6/20/23

## 2023-08-23 NOTE — PROGRESS NOTES
CARLOS Santos   [facility]  [address]  [place] [address]     PATIENT NAME: Earline Lemus  : 1985  DATE: 23  MRN: [id number]      Billing Provider: CARLOS Santos  Level of Service:   Patient PCP Information       Provider PCP Type    CARLOS Santos [facility]            Reason for Visit / Chief Complaint: Cough, Nasal Congestion, and Generalized Body Aches   Health Maintenance Due   Topic Date Due    Hepatitis C Screening  Never done    Cervical Cancer Screening  Never done    Mammogram  Never done          Update PCP  Update Chief Complaint         History of Present Illness / Problem Focused Workflow     Earline Lemus presents to the clinic cough nasal congestion body aches. Taking otc medications without improvement.    Hemoglobin A1C   Date Value Ref Range Status   2022 5.3 4.5 - 6.6 % Final     Comment:       Normal:               <5.7%  Pre-Diabetic:       5.7% to 6.4%  Diabetic:             >6.4%  Diabetic Goal:     <7%   2021 5.3 4.5 - 6.6 % Final     Comment:       Normal:               <5.7%  Pre-Diabetic:       5.7% to 6.4%  Diabetic:             >6.4%  Diabetic Goal:     <7%        CMP  Sodium   Date Value Ref Range Status   2022 134 (L) 136 - 145 mmol/L Final     Potassium   Date Value Ref Range Status   2022 4.2 3.5 - 5.1 mmol/L Final     Chloride   Date Value Ref Range Status   2022 99 98 - 107 mmol/L Final     CO2   Date Value Ref Range Status   2022 29 21 - 32 mmol/L Final     Glucose   Date Value Ref Range Status   2022 112 (H) 74 - 106 mg/dL Final     BUN   Date Value Ref Range Status   2022 14 7 - 18 mg/dL Final     Creatinine   Date Value Ref Range Status   2022 0.90 0.55 - 1.02 mg/dL Final     Calcium   Date Value Ref Range Status   2022 9.8 8.5 - 10.1 mg/dL Final     Total Protein   Date Value Ref Range Status   2022 8.1 6.4 - 8.2 g/dL Final      Albumin   Date Value Ref Range Status   07/29/2022 3.8 3.5 - 5.0 g/dL Final     Bilirubin, Total   Date Value Ref Range Status   07/29/2022 0.3 0.0 - 1.2 mg/dL Final     Alk Phos   Date Value Ref Range Status   07/29/2022 89 37 - 98 U/L Final     AST   Date Value Ref Range Status   07/29/2022 22 15 - 37 U/L Final     ALT   Date Value Ref Range Status   07/29/2022 30 13 - 56 U/L Final     Anion Gap   Date Value Ref Range Status   07/29/2022 10 7 - 16 mmol/L Final        Lab Results   Component Value Date    WBC 11.05 (H) 07/29/2022    RBC 5.46 (H) 07/29/2022    HGB 14.9 07/29/2022    HCT 46.4 07/29/2022    MCV 85.0 07/29/2022    MCH 27.3 07/29/2022    MCHC 32.1 07/29/2022    RDW 14.7 (H) 07/29/2022     (H) 07/29/2022    MPV 9.9 07/29/2022    LYMPH 19.6 (L) 07/29/2022    LYMPH 2.17 07/29/2022    MONO 9.4 (H) 07/29/2022    EOS 0.16 07/29/2022    BASO 0.02 07/29/2022    EOSINOPHIL 1.4 07/29/2022    BASOPHIL 0.2 07/29/2022        Lab Results   Component Value Date    CHOL 200 07/29/2022    CHOL 214 (H) 06/30/2021     Lab Results   Component Value Date    HDL 38 (L) 07/29/2022    HDL 37 (L) 06/30/2021     Lab Results   Component Value Date    LDLCALC 138 07/29/2022    LDLCALC 163 06/30/2021     Lab Results   Component Value Date    TRIG 122 07/29/2022    TRIG 69 06/30/2021     Lab Results   Component Value Date    CHOLHDL 5.3 07/29/2022    CHOLHDL 5.8 06/30/2021        Wt Readings from Last 3 Encounters:   08/23/23 0906 88 kg (194 lb)   06/20/23 1608 88 kg (194 lb)   05/05/23 0817 87.1 kg (192 lb)        BP Readings from Last 3 Encounters:   08/23/23 128/67   06/20/23 125/87   05/05/23 118/79        Review of Systems     Review of Systems   Constitutional:  Negative for chills, fatigue and fever.   HENT:  Positive for nasal congestion, sinus pressure/congestion and voice change. Negative for ear pain, facial swelling, hearing loss, mouth dryness, mouth sores, postnasal drip, rhinorrhea and goiter.    Eyes:   Negative for discharge and itching.   Respiratory:  Negative for cough, shortness of breath and wheezing.    Cardiovascular:  Negative for chest pain and leg swelling.   Gastrointestinal:  Negative for abdominal pain, change in bowel habit and change in bowel habit.   Genitourinary:  Negative for difficulty urinating, dysuria, enuresis, frequency, hematuria and urgency.   Neurological:  Negative for dizziness, vertigo, syncope, weakness and headaches.   Psychiatric/Behavioral:  Negative for decreased concentration.         Medical / Social / Family History     Past Medical History:   Diagnosis Date    ADHD (attention deficit hyperactivity disorder)     Anxiety     COVID 1/17/2022    Depression     Hypertension     Maxillary sinusitis 8/11/2021    Pneumonia of right middle lobe due to infectious organism 1/27/2022       Past Surgical History:   Procedure Laterality Date    TUBAL LIGATION         Social History  Ms.  reports that she has been smoking cigarettes and vaping with nicotine. She has been exposed to tobacco smoke. She has never used smokeless tobacco. She reports current alcohol use. She reports that she does not use drugs.    Family History  Ms.'s family history includes Heart disease in her father.    Medications and Allergies     Medications  Outpatient Medications Marked as Taking for the 8/23/23 encounter (Office Visit) with Yudith Cain FNP   Medication Sig Dispense Refill    citalopram (CELEXA) 20 MG tablet Take 1 tablet (20 mg total) by mouth once daily. 30 tablet 11    dextroamphetamine-amphetamine (MYDAYIS) 50 mg CT24 Take 1 capsule by mouth once daily. 30 each 0    omeprazole (PRILOSEC) 40 MG capsule TAKE ONE CAPSULE BY MOUTH TWICE DAILY 90 capsule 1    sucralfate (CARAFATE) 1 gram tablet Take 1 tablet (1 g total) by mouth 2 (two) times daily. 60 tablet 0    telmisartan (MICARDIS) 40 MG Tab Take 1 tablet (40 mg total) by mouth once daily. 90 tablet 1     Current  "Facility-Administered Medications for the 8/23/23 encounter (Office Visit) with Yudith Cain FNP   Medication Dose Route Frequency Provider Last Rate Last Admin    [COMPLETED] dexAMETHasone injection 4 mg  4 mg Intramuscular 1 time in Clinic/HOD Yudith Cain FNP   4 mg at 08/23/23 1012    [COMPLETED] methylPREDNISolone acetate injection 40 mg  40 mg Intramuscular 1 time in Clinic/HOD Yudith Cain FNSUKUMAR   40 mg at 08/23/23 1013       Allergies  Review of patient's allergies indicates:  No Known Allergies    Physical Examination     Vitals:    08/23/23 0906   BP: 128/67   BP Location: Left arm   Patient Position: Sitting   Pulse: 84   Resp: 18   Temp: 98 °F (36.7 °C)   TempSrc: Oral   SpO2: 100%   Weight: 88 kg (194 lb)   Height: 5' 5" (1.651 m)      Physical Exam  Vitals and nursing note reviewed.   Constitutional:       Appearance: Normal appearance.   HENT:      Right Ear: Ear canal and external ear normal. Tympanic membrane is bulging.      Left Ear: Ear canal and external ear normal. Tympanic membrane is bulging.      Nose: Mucosal edema and congestion present.      Right Turbinates: Swollen.      Left Turbinates: Swollen.      Comments: Allergic salute     Mouth/Throat:      Mouth: Mucous membranes are dry.      Pharynx: No posterior oropharyngeal erythema or uvula swelling.   Eyes:      General: No scleral icterus.     Conjunctiva/sclera: Conjunctivae normal.      Pupils: Pupils are equal, round, and reactive to light.   Cardiovascular:      Rate and Rhythm: Normal rate and regular rhythm.      Pulses: Normal pulses.      Heart sounds: Normal heart sounds.   Musculoskeletal:      Cervical back: Normal range of motion and neck supple.   Skin:     Findings: Erythema present.      Comments: flushed   Neurological:      Mental Status: She is alert and oriented to person, place, and time.          Assessment and Plan (including Health Maintenance)      Problem List  Smart Sets "  Document Outside HM   :    Plan:     Patient Instructions   Will give decadron depomedrol today stop mucinex will add zyrtec at night and aleve d for sinus headache.        Health Maintenance Due   Topic Date Due    Hepatitis C Screening  Never done    Cervical Cancer Screening  Never done    Mammogram  Never done       Problem List Items Addressed This Visit    None  Visit Diagnoses       Acute recurrent maxillary sinusitis    -  Primary    Relevant Medications    dexAMETHasone injection 4 mg (Completed)    methylPREDNISolone acetate injection 40 mg (Completed)    naproxen-pseudoephedrine 220-120 mg Tb12          Acute recurrent maxillary sinusitis  -     dexAMETHasone injection 4 mg  -     methylPREDNISolone acetate injection 40 mg  -     naproxen-pseudoephedrine 220-120 mg Tb12; Take 1 tablet by mouth daily as needed (sinus headache).  Dispense: 10 each; Refill: 0       Health Maintenance Topics with due status: Not Due       Topic Last Completion Date    Hemoglobin A1c (Diabetic Prevention Screening) 07/29/2022    TETANUS VACCINE 09/15/2022    Influenza Vaccine 12/01/2022         Future Appointments   Date Time Provider Department Center   9/20/2023  3:40 PM Jazmin Arnold NP [facility]   1/22/2024  8:40 AM Jazmin Arnold NP [facility]        Follow up if symptoms worsen or fail to improve.    Health Maintenance Due   Topic Date Due    Hepatitis C Screening  Never done    Cervical Cancer Screening  Never done    Mammogram  Never done        Signature:  CARLOS Santos    Date of encounter: 8/23/23

## 2023-08-23 NOTE — PATIENT INSTRUCTIONS
Will give decadron depomedrol today stop mucinex will add zyrtec at night and aleve d for sinus headache.

## 2023-09-12 PROBLEM — Z78.9 NO IMMUNIZATION HISTORY RECORD: Status: ACTIVE | Noted: 2023-01-01

## 2023-09-12 NOTE — PROGRESS NOTES
Subjective     Patient ID: Earline Lemus is a 38 y.o. female.    Chief Complaint: Employment Physical (Patient needs vaccines/titers for work. Patient's [employer] account is does not exist. )    Pt is here bc she has no immunization records nor does her immunizations show in [employer] the [facility] health department as recorded. Pt has accepted a new job and this is a requirement for the job.    Review of Systems   Constitutional:  Negative for chills, fatigue and fever.   HENT:  Negative for nasal congestion, ear pain and hearing loss.    Eyes:  Negative for discharge and itching.   Respiratory:  Negative for cough, shortness of breath and wheezing.    Cardiovascular:  Negative for chest pain, palpitations, leg swelling and claudication.   Gastrointestinal:  Negative for abdominal pain, blood in stool, change in bowel habit, nausea, vomiting and change in bowel habit.   Genitourinary:  Negative for decreased urine volume and difficulty urinating.   Integumentary:  Negative for rash.   Neurological:  Negative for dizziness, seizures, syncope, weakness and headaches.   Psychiatric/Behavioral:  Negative for decreased concentration.           Objective     Physical Exam  Vitals and nursing note reviewed.   Constitutional:       Appearance: Normal appearance. She is not ill-appearing.   HENT:      Head: Normocephalic.      Right Ear: Tympanic membrane, ear canal and external ear normal.      Left Ear: Tympanic membrane, ear canal and external ear normal.      Nose: Nose normal.      Mouth/Throat:      Mouth: Mucous membranes are moist.      Pharynx: Oropharynx is clear.   Eyes:      Extraocular Movements: Extraocular movements intact.      Conjunctiva/sclera: Conjunctivae normal.      Pupils: Pupils are equal, round, and reactive to light.   Cardiovascular:      Rate and Rhythm: Normal rate and regular rhythm.      Pulses: Normal pulses.      Heart sounds: Normal heart sounds.   Pulmonary:      Effort: Pulmonary effort is normal.  No respiratory distress.      Breath sounds: Normal breath sounds.   Abdominal:      General: Bowel sounds are normal.      Palpations: Abdomen is soft.      Tenderness: There is no abdominal tenderness.   Musculoskeletal:         General: Normal range of motion.      Cervical back: Normal range of motion and neck supple. No tenderness.   Skin:     General: Skin is warm and dry.      Capillary Refill: Capillary refill takes less than 2 seconds.   Neurological:      General: No focal deficit present.      Mental Status: She is alert and oriented to person, place, and time.   Psychiatric:         Mood and Affect: Mood normal.         Behavior: Behavior normal.         Thought Content: Thought content normal.         Judgment: Judgment normal.          Assessment and Plan     1. No immunization history record  Assessment & Plan:  Vaccine titer panel    Orders:  -     Rubella antibody, IgG; Future; Expected date: 09/12/2023  -     Rubeola antibody IgG; Future; Expected date: 09/12/2023  -     Mumps, IgG Screen; Future; Expected date: 09/12/2023  -     Hepatitis B Surface Antibody, Qual/Quant; Future; Expected date: 09/12/2023  -     Varicella zoster antibody, IgG; Future; Expected date: 09/12/2023               Follow up if symptoms worsen or fail to improve.

## 2023-09-13 NOTE — PROGRESS NOTES
Population Health review of encounter with date of service 9/12/23.  Does not qualify for HY or [facility].  Has HY scheduled for 1/22/2024. Barbara

## 2023-09-19 PROBLEM — K21.9 GASTROESOPHAGEAL REFLUX DISEASE WITHOUT ESOPHAGITIS: Status: ACTIVE | Noted: 2023-01-01

## 2023-09-19 NOTE — ASSESSMENT & PLAN NOTE
History of ADHD but has not been tested in several years.  Recommend that she be retested.  Also recommended that she see a [facility] [place].  Will renew her prescription for ADHD medicines however she has some symptoms that are suggesting that she may actually have bipolar 2

## 2023-09-19 NOTE — ASSESSMENT & PLAN NOTE
History of peptic ulcer disease currently being controlled with omeprazole mg twice a day daily as well as a Carafate 2 times daily.  No change in her medicines.  Follow-up every 3 months or p.r.n.

## 2023-09-19 NOTE — PROGRESS NOTES
Geovanny Sherman DO   [address]  [place]  [address]      PATIENT NAME: Earline Lemus  : 1985  DATE: 23  MRN: [id number]      Billing Provider: Geovanny Sherman DO  Level of Service:   Patient PCP Information       Provider PCP Type    Yudith Cain, [facility]            Reason for Visit / Chief Complaint: ADHD (Med refills )       Update PCP  Update Chief Complaint         History of Present Illness / Problem Focused Workflow     Earline Lemus presents to the clinic with ADHD (Med refills )     Patient with history of ADHD however she has depressive symptoms and severe anxiety at times.  She reports some hypo holly type episodes where her thought processes or rambling.  She states the ADH D medicines sometimes helped in sometimes does not.  She does have some depressive symptoms.  She denies any chest pain shortness in breath palpitations.  No suicidal or homicidal ideation.        Review of Systems     Review of Systems   Constitutional:  Negative for activity change, appetite change, chills, fatigue and fever.   HENT:  Negative for nasal congestion, ear discharge, ear pain, mouth dryness, mouth sores, postnasal drip, sinus pressure/congestion, sore throat and voice change.    Eyes:  Negative for pain, discharge, redness, itching and visual disturbance.   Respiratory:  Negative for apnea, cough, chest tightness, shortness of breath and wheezing.    Cardiovascular:  Negative for chest pain, palpitations and leg swelling.   Gastrointestinal:  Positive for reflux. Negative for abdominal distention, abdominal pain, anal bleeding, blood in stool, change in bowel habit, constipation, diarrhea, nausea and vomiting.   Endocrine: Negative for cold intolerance, heat intolerance, polydipsia, polyphagia and polyuria.   Genitourinary:  Negative for difficulty urinating, enuresis, frequency, genital sores, hematuria, hot flashes, menstrual irregularity, urgency and  vaginal dryness.   Musculoskeletal:  Negative for arthralgias, back pain, gait problem, leg pain, myalgias and neck pain.   Integumentary:  Negative for rash, mole/lesion, breast mass, breast discharge and breast tenderness.   Allergic/Immunologic: Negative for environmental allergies and food allergies.   Neurological:  Negative for dizziness, vertigo, tremors, seizures, syncope, facial asymmetry, speech difficulty, weakness, light-headedness, numbness, headaches, memory loss and coordination difficulties.   Hematological:  Negative for adenopathy. Does not bruise/bleed easily.   Psychiatric/Behavioral:  Negative for agitation, behavioral problems, confusion, decreased concentration, dysphoric mood, hallucinations, self-injury, sleep disturbance and suicidal ideas. The patient is nervous/anxious. The patient is not hyperactive.    Breast: Negative for mass and tenderness      Medical / Social / Family History     Past Medical History:   Diagnosis Date    ADHD (attention deficit hyperactivity disorder)     Anxiety     COVID 1/17/2022    Depression     Hypertension     Maxillary sinusitis 8/11/2021    Pneumonia of right middle lobe due to infectious organism 1/27/2022       Past Surgical History:   Procedure Laterality Date    TUBAL LIGATION         Social History  Ms.  reports that she has been smoking cigarettes and vaping with nicotine. She has been exposed to tobacco smoke. She has never used smokeless tobacco. She reports current alcohol use. She reports that she does not use drugs.    Family History  Ms.'s family history includes Heart disease in her father.    Medications and Allergies     Medications  Outpatient Medications Marked as Taking for the 9/18/23 encounter (Office Visit) with Geovanny Sherman, DO   Medication Sig Dispense Refill    [DISCONTINUED] citalopram (CELEXA) 20 MG tablet Take 1 tablet (20 mg total) by mouth once daily. 30 tablet 11    [DISCONTINUED] dextroamphetamine-amphetamine (MYDAYIS)  50 mg CT24 Take 1 capsule by mouth once daily. 30 each 0    [DISCONTINUED] omeprazole (PRILOSEC) 40 MG capsule TAKE ONE CAPSULE BY MOUTH TWICE DAILY 90 capsule 1    [DISCONTINUED] sucralfate (CARAFATE) 1 gram tablet Take 1 tablet (1 g total) by mouth 2 (two) times daily. 60 tablet 0    [DISCONTINUED] telmisartan (MICARDIS) 40 MG Tab Take 1 tablet (40 mg total) by mouth once daily. 90 tablet 1       Allergies  Review of patient's allergies indicates:  No Known Allergies    Physical Examination     Vitals:    09/18/23 1640   BP: 114/78   Pulse: 80   Resp: 19   Temp: 98.3 °F (36.8 °C)     Physical Exam  Vitals reviewed.   Constitutional:       General: She is not in acute distress.     Appearance: Normal appearance. She is obese.   HENT:      Head: Normocephalic and atraumatic.      Nose: Nose normal.      Mouth/Throat:      Mouth: Mucous membranes are moist.      Pharynx: Oropharynx is clear. No oropharyngeal exudate or posterior oropharyngeal erythema.   Eyes:      General: No scleral icterus.     Extraocular Movements: Extraocular movements intact.      Conjunctiva/sclera: Conjunctivae normal.      Pupils: Pupils are equal, round, and reactive to light.   Neck:      Vascular: No carotid bruit.   Cardiovascular:      Rate and Rhythm: Normal rate and regular rhythm.      Pulses: Normal pulses.      Heart sounds: Normal heart sounds. No murmur heard.     No gallop.   Pulmonary:      Effort: Pulmonary effort is normal.      Breath sounds: Normal breath sounds. No wheezing.   Abdominal:      General: Abdomen is flat. Bowel sounds are normal.      Palpations: Abdomen is soft.   Musculoskeletal:         General: Normal range of motion.      Cervical back: Normal range of motion and neck supple.      Right lower leg: No edema.      Left lower leg: No edema.   Lymphadenopathy:      Cervical: No cervical adenopathy.   Skin:     General: Skin is warm and dry.      Capillary Refill: Capillary refill takes less than 2 seconds.       Coloration: Skin is not jaundiced.      Findings: No lesion.   Neurological:      General: No focal deficit present.      Mental Status: She is alert and oriented to person, place, and time. Mental status is at baseline.      Cranial Nerves: No cranial nerve deficit.      Sensory: No sensory deficit.      Motor: No weakness.      Coordination: Coordination normal.      Gait: Gait normal.      Deep Tendon Reflexes: Reflexes normal.   Psychiatric:         Mood and Affect: Mood normal.         Behavior: Behavior normal.         Thought Content: Thought content normal.         Judgment: Judgment normal.               Lab Results   Component Value Date    WBC 11.05 (H) 07/29/2022    HGB 14.9 07/29/2022    HCT 46.4 07/29/2022    MCV 85.0 07/29/2022     (H) 07/29/2022          Sodium   Date Value Ref Range Status   07/29/2022 134 (L) 136 - 145 mmol/L Final     Potassium   Date Value Ref Range Status   07/29/2022 4.2 3.5 - 5.1 mmol/L Final     Chloride   Date Value Ref Range Status   07/29/2022 99 98 - 107 mmol/L Final     CO2   Date Value Ref Range Status   07/29/2022 29 21 - 32 mmol/L Final     Glucose   Date Value Ref Range Status   07/29/2022 112 (H) 74 - 106 mg/dL Final     BUN   Date Value Ref Range Status   07/29/2022 14 7 - 18 mg/dL Final     Creatinine   Date Value Ref Range Status   07/29/2022 0.90 0.55 - 1.02 mg/dL Final     Calcium   Date Value Ref Range Status   07/29/2022 9.8 8.5 - 10.1 mg/dL Final     Total Protein   Date Value Ref Range Status   07/29/2022 8.1 6.4 - 8.2 g/dL Final     Albumin   Date Value Ref Range Status   07/29/2022 3.8 3.5 - 5.0 g/dL Final     Bilirubin, Total   Date Value Ref Range Status   07/29/2022 0.3 0.0 - 1.2 mg/dL Final     Alk Phos   Date Value Ref Range Status   07/29/2022 89 37 - 98 U/L Final     AST   Date Value Ref Range Status   07/29/2022 22 15 - 37 U/L Final     ALT   Date Value Ref Range Status   07/29/2022 30 13 - 56 U/L Final     Anion Gap   Date Value Ref  Range Status   07/29/2022 10 7 - 16 mmol/L Final     eGFR   Date Value Ref Range Status   07/29/2022 74 >=60 mL/min/1.73m² Final      X-ray Shoulder 2 or More Views Right  Narrative: EXAMINATION:  XR SHOULDER COMPLETE 2 OR MORE VIEWS RIGHT    CLINICAL HISTORY:  Pain in right shoulder    COMPARISON:  None available    FINDINGS:  No evidence of fracture seen.  The alignment of the joints appears normal.  Mild acromioclavicular joint and glenohumeral degenerative change is present.  No soft tissue abnormality is seen.  Impression: Point shoulder osteoarthrosis as described above.    Electronically signed by: Ottoniel Fajardo  Date:    10/13/2022  Time:    09:59     Procedures   Lab Results   Component Value Date    CHOL 200 07/29/2022    CHOL 214 (H) 06/30/2021     Lab Results   Component Value Date    HDL 38 (L) 07/29/2022    HDL 37 (L) 06/30/2021     Lab Results   Component Value Date    LDLCALC 138 07/29/2022    LDLCALC 163 06/30/2021     Lab Results   Component Value Date    TRIG 122 07/29/2022    TRIG 69 06/30/2021     Lab Results   Component Value Date    CHOLHDL 5.3 07/29/2022    CHOLHDL 5.8 06/30/2021      Lab Results   Component Value Date    HGBA1C 5.3 07/29/2022      Lab Results   Component Value Date    TSH 0.467 07/29/2022    I6OWGCT 99 08/30/2021    Q8DYAXA 6.6 08/30/2021      Assessment and Plan (including Health Maintenance)      Problem List  Smart Sets  Document Outside HM   :    Plan:         Health Maintenance Due   Topic Date Due    Hepatitis C Screening  Never done    Cervical Cancer Screening  Never done    Pneumococcal Vaccines (Age 0-64) (1 - PCV) Never done    Mammogram  Never done    Influenza Vaccine (1) 09/01/2023       Problem List Items Addressed This Visit          Psychiatric    Anxiety    Current Assessment & Plan     Anxiety is not well controlled with citalopram.  Patient does have hypomania symptoms and depressive symptoms which suggest bipolar 2.  [facility]  Patient may do better with her anxiety using Vraylar.  Patient has previously been on atypical antipsychotics and stated it did not help.           Relevant Medications    citalopram (CELEXA) 20 MG tablet    Hx of attention deficit disorder    Current Assessment & Plan     Needs evaluation for ADHD but will refill her medications.  She is to follow-up after 3 months.         Relevant Medications    dextroamphetamine-amphetamine (MYDAYIS) 50 mg CT24    ADHD (attention deficit hyperactivity disorder)    Current Assessment & Plan     History of ADHD but has not been tested in several years.  Recommend that she be retested.  Also recommended that [facility] [place].  Will renew her prescription for ADHD medicines however she has some symptoms that are suggesting that she may actually have bipolar 2         Relevant Medications    dextroamphetamine-amphetamine (MYDAYIS) 50 mg CT24    dextroamphetamine-amphetamine (MYDAYIS) 50 mg CT24    dextroamphetamine-amphetamine (MYDAYIS) 50 mg CT24       Cardiac/Vascular    Hypertension    Current Assessment & Plan     Hypertension well controlled no change in medication.  Follow-up in 3 months.  Will check CBC and CMP yearly.  Goal is blood pressure 120/70         Relevant Medications    telmisartan (MICARDIS) 40 MG Tab       GI    Gastroesophageal reflux disease without esophagitis    Current Assessment & Plan     History of peptic ulcer disease currently being controlled with omeprazole mg twice a day daily as well as a Carafate 2 times daily.  No change in her medicines.  Follow-up every 3 months or p.r.n.         Relevant Medications    omeprazole (PRILOSEC) 40 MG capsule     Other Visit Diagnoses       Pain of upper abdomen        Relevant Medications    sucralfate (CARAFATE) 1 gram tablet    Dyspepsia        Relevant Medications    sucralfate (CARAFATE) 1 gram tablet            Health Maintenance Topics with due status:  Not Due       Topic Last Completion Date    Hemoglobin A1c (Diabetic Prevention Screening) 07/29/2022    TETANUS VACCINE 09/15/2022       Future Appointments   Date Time Provider Department Center   10/4/2023  3:40 PM Geovanny Sherman DO [facility]   1/22/2024  8:40 AM Jazmin Arnold NP [facility]        Follow up in about 3 months (around 12/18/2023).     Signature:  Geovanny Sherman DO  [address]  [id number]    Date of encounter: 9/18/23

## 2023-09-19 NOTE — ASSESSMENT & PLAN NOTE
Anxiety is not well controlled with citalopram.  Patient does have hypomania symptoms and depressive symptoms which suggest bipolar 2.  [facility].  Patient may do better with her anxiety using Vraylar.  Patient has previously been on atypical antipsychotics and stated it did not help.

## 2023-12-01 NOTE — ED PROVIDER NOTES
Encounter Date: 12/1/2023       History   No chief complaint on file.    38 y-old  female received to ED via [facility] s/p arrest at home. Per Paramedic, the family states that the patient started having seizure like activity and suddenly collapsed. CPR was not started for >10 min and patient was down >20 min prior to EMS arrival. Upon EMS arrival the patient was unresponsive, no pulse, no spontaneous resp. EKG at some point in transfer showed a V-tach for which patient was defibrillated twice. Subsequent rhythm strips showed PEA. Upon arrival to ED CPR was in progress. Initial rhythm check showed PEA, no spontaneous resp, no auscultated or palpated pulse, pupils fixed and dilated. CPR was continued and 2 more amps of EPI were given to the patient. Emergent Telemergency consult was obtained with Dr. Colbert. Code was called at 0749.        Review of patient's allergies indicates:  No Known Allergies  Past Medical History:   Diagnosis Date    ADHD (attention deficit hyperactivity disorder)     Anxiety     COVID 1/17/2022    Depression     Hypertension     Maxillary sinusitis 8/11/2021    Pneumonia of right middle lobe due to infectious organism 1/27/2022     Past Surgical History:   Procedure Laterality Date    TUBAL LIGATION       Family History   Problem Relation Age of Onset    Heart disease Father      Social History     Tobacco Use    Smoking status: Every Day     Current packs/day: 0.50     Types: Cigarettes, Vaping with nicotine     Passive exposure: Current    Smokeless tobacco: Never    Tobacco comments:     vapes more than she smokes   Substance Use Topics    Alcohol use: Yes    Drug use: Never     Review of Systems   Unable to perform ROS: Patient unresponsive       Physical Exam     Initial Vitals   BP Pulse Resp Temp SpO2   -- -- -- -- --      MAP       --         Physical Exam    Nursing note and vitals reviewed.  Constitutional: She appears well-developed and well-nourished.   HENT:   Head:  Normocephalic.   Eyes:   Pupils fixed and dilated upon arrival   Cardiovascular:            PEA on monitor   Pulmonary/Chest: Breath sounds normal.   Intubated upon arrival   Abdominal: Abdomen is soft.     Neurological:   GCS of 3         Medical Screening Exam   See Full Note    ED Course   Procedures  Labs Reviewed - No data to display       Imaging Results    None          Medications   EPINEPHrine 0.1 mg/mL injection 1 mg (has no administration in time range)   EPINEPHrine 0.1 mg/mL injection 1 mg (has no administration in time range)     Medical Decision Making  38 y-old  female received to ED via [facility] s/p arrest at home. Per Paramedic, the family states that the patient started having seizure like activity and suddenly collapsed. CPR was not started for >10 min and patient was down >20 min prior to EMS arrival. Upon EMS arrival the patient was unresponsive, no pulse, no spontaneous resp. EKG at some point in transfer showed a V-tach for which patient was defibrillated twice. Subsequent rhythm strips showed PEA. Upon arrival to ED CPR was in progress. Initial rhythm check showed PEA, no spontaneous resp, no auscultated or palpated pulse, pupils fixed and dilated. CPR was continued and 2 more amps of EPI were given to the patient. Emergent Telemergency consult was obtained with Dr. Colbert. Code was called at 0749.        Amount and/or Complexity of Data Reviewed  Discussion of management or test interpretation with external provider(s): Emergent consultation with Dr. Colbert at [facility] obtained upon arrival     Risk  Risk Details: Patient presents in full arrest, received 4 amps of EPI, 1 amp of calcium gluconate, 1 amp of bicarb and 400 ml of fluid en-route. Received 2 more amps of EPI while in ED. Decision was made to stop resuscitation. Time of death 0749                                       Clinical Impression:   Final diagnoses:  [I46.9] Cardiopulmonary arrest (Primary)  [R99] Death         ED Disposition Condition    Discharge Stable          ED Prescriptions    None       Follow-up Information    None          Jed Yuan NP-C  12/01/23 0816
